# Patient Record
Sex: FEMALE | Race: WHITE | Employment: OTHER | URBAN - NONMETROPOLITAN AREA
[De-identification: names, ages, dates, MRNs, and addresses within clinical notes are randomized per-mention and may not be internally consistent; named-entity substitution may affect disease eponyms.]

---

## 2019-06-13 ENCOUNTER — TELEPHONE (OUTPATIENT)
Dept: FAMILY MEDICINE CLINIC | Age: 84
End: 2019-06-13

## 2019-06-20 ENCOUNTER — HOSPITAL ENCOUNTER (OUTPATIENT)
Age: 84
Discharge: HOME | End: 2019-06-22
Payer: MEDICARE

## 2019-06-20 DIAGNOSIS — I10 ESSENTIAL HYPERTENSION: Primary | ICD-10-CM

## 2019-06-20 DIAGNOSIS — E55.9 VITAMIN D DEFICIENCY: ICD-10-CM

## 2019-06-20 DIAGNOSIS — I10 ESSENTIAL HYPERTENSION: ICD-10-CM

## 2019-06-20 DIAGNOSIS — D51.9 ANEMIA DUE TO VITAMIN B12 DEFICIENCY, UNSPECIFIED B12 DEFICIENCY TYPE: ICD-10-CM

## 2019-06-20 DIAGNOSIS — E11.9 TYPE 2 DIABETES MELLITUS WITHOUT COMPLICATION, WITHOUT LONG-TERM CURRENT USE OF INSULIN (HCC): ICD-10-CM

## 2019-06-20 DIAGNOSIS — E78.5 HYPERLIPIDEMIA, UNSPECIFIED HYPERLIPIDEMIA TYPE: ICD-10-CM

## 2019-06-20 LAB
ALBUMIN SERPL-MCNC: 4.3 G/DL (ref 3.5–5.2)
ALP BLD-CCNC: 80 U/L (ref 35–104)
ALT SERPL-CCNC: 13 U/L (ref 0–32)
ANION GAP SERPL CALCULATED.3IONS-SCNC: 13 MMOL/L (ref 7–16)
AST SERPL-CCNC: 22 U/L (ref 0–31)
BASOPHILS ABSOLUTE: 0.05 E9/L (ref 0–0.2)
BASOPHILS RELATIVE PERCENT: 0.7 % (ref 0–2)
BILIRUB SERPL-MCNC: 1.2 MG/DL (ref 0–1.2)
BUN BLDV-MCNC: 18 MG/DL (ref 8–23)
C-REACTIVE PROTEIN: 0.2 MG/DL (ref 0–0.4)
CALCIUM SERPL-MCNC: 9.2 MG/DL (ref 8.6–10.2)
CHLORIDE BLD-SCNC: 104 MMOL/L (ref 98–107)
CHOLESTEROL, TOTAL: 211 MG/DL (ref 0–199)
CO2: 26 MMOL/L (ref 22–29)
CREAT SERPL-MCNC: 0.9 MG/DL (ref 0.5–1)
CREATININE URINE: 88 MG/DL (ref 29–226)
EOSINOPHILS ABSOLUTE: 0.32 E9/L (ref 0.05–0.5)
EOSINOPHILS RELATIVE PERCENT: 4.7 % (ref 0–6)
FERRITIN: 204 NG/ML
FOLATE: >20 NG/ML (ref 4.8–24.2)
GFR AFRICAN AMERICAN: >60
GFR NON-AFRICAN AMERICAN: 59 ML/MIN/1.73
GLUCOSE BLD-MCNC: 94 MG/DL (ref 74–99)
HBA1C MFR BLD: 6.2 % (ref 4–5.6)
HCT VFR BLD CALC: 35.2 % (ref 34–48)
HDLC SERPL-MCNC: 47 MG/DL
HEMOGLOBIN: 10.9 G/DL (ref 11.5–15.5)
IMMATURE GRANULOCYTES #: 0.01 E9/L
IMMATURE GRANULOCYTES %: 0.1 % (ref 0–5)
IRON SATURATION: 32 % (ref 15–50)
IRON: 79 MCG/DL (ref 37–145)
LDL CHOLESTEROL CALCULATED: 105 MG/DL (ref 0–99)
LYMPHOCYTES ABSOLUTE: 2.48 E9/L (ref 1.5–4)
LYMPHOCYTES RELATIVE PERCENT: 36.4 % (ref 20–42)
MCH RBC QN AUTO: 33.3 PG (ref 26–35)
MCHC RBC AUTO-ENTMCNC: 31 % (ref 32–34.5)
MCV RBC AUTO: 107.6 FL (ref 80–99.9)
MICROALBUMIN UR-MCNC: <12 MG/L
MICROALBUMIN/CREAT UR-RTO: ABNORMAL (ref 0–30)
MONOCYTES ABSOLUTE: 0.59 E9/L (ref 0.1–0.95)
MONOCYTES RELATIVE PERCENT: 8.7 % (ref 2–12)
NEUTROPHILS ABSOLUTE: 3.37 E9/L (ref 1.8–7.3)
NEUTROPHILS RELATIVE PERCENT: 49.4 % (ref 43–80)
PDW BLD-RTO: 12 FL (ref 11.5–15)
PLATELET # BLD: 365 E9/L (ref 130–450)
PMV BLD AUTO: 10.9 FL (ref 7–12)
POTASSIUM SERPL-SCNC: 4.4 MMOL/L (ref 3.5–5)
RBC # BLD: 3.27 E12/L (ref 3.5–5.5)
SODIUM BLD-SCNC: 143 MMOL/L (ref 132–146)
T4 FREE: 1.26 NG/DL (ref 0.93–1.7)
TOTAL CK: 45 U/L (ref 20–180)
TOTAL IRON BINDING CAPACITY: 248 MCG/DL (ref 250–450)
TOTAL PROTEIN: 7.5 G/DL (ref 6.4–8.3)
TRIGL SERPL-MCNC: 293 MG/DL (ref 0–149)
TSH SERPL DL<=0.05 MIU/L-ACNC: 0.99 UIU/ML (ref 0.27–4.2)
URIC ACID, SERUM: 5 MG/DL (ref 2.4–5.7)
VITAMIN B-12: 1053 PG/ML (ref 211–946)
VITAMIN D 25-HYDROXY: 73 NG/ML (ref 30–100)
VLDLC SERPL CALC-MCNC: 59 MG/DL
WBC # BLD: 6.8 E9/L (ref 4.5–11.5)

## 2019-06-20 PROCEDURE — 82728 ASSAY OF FERRITIN: CPT

## 2019-06-20 PROCEDURE — 83550 IRON BINDING TEST: CPT

## 2019-06-20 PROCEDURE — 80061 LIPID PANEL: CPT

## 2019-06-20 PROCEDURE — 84550 ASSAY OF BLOOD/URIC ACID: CPT

## 2019-06-20 PROCEDURE — 84443 ASSAY THYROID STIM HORMONE: CPT

## 2019-06-20 PROCEDURE — 82044 UR ALBUMIN SEMIQUANTITATIVE: CPT

## 2019-06-20 PROCEDURE — 83036 HEMOGLOBIN GLYCOSYLATED A1C: CPT

## 2019-06-20 PROCEDURE — 80053 COMPREHEN METABOLIC PANEL: CPT

## 2019-06-20 PROCEDURE — 36415 COLL VENOUS BLD VENIPUNCTURE: CPT

## 2019-06-20 PROCEDURE — 82306 VITAMIN D 25 HYDROXY: CPT

## 2019-06-20 PROCEDURE — 82550 ASSAY OF CK (CPK): CPT

## 2019-06-20 PROCEDURE — 82607 VITAMIN B-12: CPT

## 2019-06-20 PROCEDURE — 85025 COMPLETE CBC W/AUTO DIFF WBC: CPT

## 2019-06-20 PROCEDURE — 84439 ASSAY OF FREE THYROXINE: CPT

## 2019-06-20 PROCEDURE — 86140 C-REACTIVE PROTEIN: CPT

## 2019-06-20 PROCEDURE — 82570 ASSAY OF URINE CREATININE: CPT

## 2019-06-20 PROCEDURE — 83540 ASSAY OF IRON: CPT

## 2019-06-20 PROCEDURE — 82746 ASSAY OF FOLIC ACID SERUM: CPT

## 2019-06-24 ENCOUNTER — OFFICE VISIT (OUTPATIENT)
Dept: FAMILY MEDICINE CLINIC | Age: 84
End: 2019-06-24
Payer: MEDICARE

## 2019-06-24 VITALS
HEART RATE: 76 BPM | HEIGHT: 55 IN | OXYGEN SATURATION: 93 % | SYSTOLIC BLOOD PRESSURE: 124 MMHG | WEIGHT: 111.4 LBS | TEMPERATURE: 98.7 F | DIASTOLIC BLOOD PRESSURE: 62 MMHG | BODY MASS INDEX: 25.78 KG/M2

## 2019-06-24 DIAGNOSIS — E11.9 TYPE 2 DIABETES MELLITUS WITHOUT COMPLICATION, WITHOUT LONG-TERM CURRENT USE OF INSULIN (HCC): ICD-10-CM

## 2019-06-24 DIAGNOSIS — E55.9 VITAMIN D DEFICIENCY: ICD-10-CM

## 2019-06-24 DIAGNOSIS — I10 ESSENTIAL HYPERTENSION: Primary | ICD-10-CM

## 2019-06-24 DIAGNOSIS — E78.5 HYPERLIPIDEMIA, UNSPECIFIED HYPERLIPIDEMIA TYPE: ICD-10-CM

## 2019-06-24 PROCEDURE — 99214 OFFICE O/P EST MOD 30 MIN: CPT | Performed by: FAMILY MEDICINE

## 2019-06-24 RX ORDER — LEVOTHYROXINE SODIUM 0.03 MG/1
25 TABLET ORAL DAILY
Qty: 90 TABLET | Refills: 1 | Status: SHIPPED | OUTPATIENT
Start: 2019-06-24 | End: 2020-01-03 | Stop reason: SDUPTHER

## 2019-06-24 RX ORDER — GLIPIZIDE 5 MG/1
5 TABLET, FILM COATED, EXTENDED RELEASE ORAL
Qty: 180 TABLET | Refills: 1 | Status: SHIPPED | OUTPATIENT
Start: 2019-06-24 | End: 2020-01-03 | Stop reason: SDUPTHER

## 2019-06-24 RX ORDER — FLUTICASONE PROPIONATE 50 MCG
1 SPRAY, SUSPENSION (ML) NASAL DAILY
COMMUNITY
End: 2020-01-03 | Stop reason: ALTCHOICE

## 2019-06-24 RX ORDER — PAROXETINE HYDROCHLORIDE 20 MG/1
20 TABLET, FILM COATED ORAL
COMMUNITY
Start: 2019-04-23 | End: 2019-06-24 | Stop reason: SDUPTHER

## 2019-06-24 RX ORDER — LOSARTAN POTASSIUM 50 MG/1
50 TABLET ORAL DAILY
COMMUNITY
Start: 2019-04-23 | End: 2019-06-24 | Stop reason: SDUPTHER

## 2019-06-24 RX ORDER — PANTOPRAZOLE SODIUM 40 MG/1
40 TABLET, DELAYED RELEASE ORAL
COMMUNITY
End: 2019-06-24 | Stop reason: SDUPTHER

## 2019-06-24 RX ORDER — GLIPIZIDE 5 MG/1
5 TABLET, FILM COATED, EXTENDED RELEASE ORAL
COMMUNITY
Start: 2019-04-23 | End: 2019-06-24 | Stop reason: SDUPTHER

## 2019-06-24 RX ORDER — OXYBUTYNIN CHLORIDE 5 MG/1
5 TABLET ORAL DAILY
COMMUNITY
Start: 2019-04-23 | End: 2019-06-24 | Stop reason: SDUPTHER

## 2019-06-24 RX ORDER — DONEPEZIL HYDROCHLORIDE 10 MG/1
10 TABLET, FILM COATED ORAL
COMMUNITY
Start: 2019-04-23 | End: 2019-06-24 | Stop reason: SDUPTHER

## 2019-06-24 RX ORDER — LANSOPRAZOLE 15 MG/1
15 CAPSULE, DELAYED RELEASE ORAL DAILY
COMMUNITY
End: 2020-06-22 | Stop reason: SDUPTHER

## 2019-06-24 RX ORDER — PAROXETINE HYDROCHLORIDE 20 MG/1
20 TABLET, FILM COATED ORAL EVERY MORNING
Qty: 90 TABLET | Refills: 1 | Status: SHIPPED | OUTPATIENT
Start: 2019-06-24 | End: 2020-01-03 | Stop reason: SDUPTHER

## 2019-06-24 RX ORDER — DOCUSATE SODIUM 100 MG/1
100 CAPSULE, LIQUID FILLED ORAL DAILY
COMMUNITY

## 2019-06-24 RX ORDER — M-VIT,TX,IRON,MINS/CALC/FOLIC 27MG-0.4MG
1 TABLET ORAL DAILY
COMMUNITY
End: 2020-12-21

## 2019-06-24 RX ORDER — OXYBUTYNIN CHLORIDE 5 MG/1
5 TABLET ORAL DAILY
Qty: 90 TABLET | Refills: 1 | Status: SHIPPED | OUTPATIENT
Start: 2019-06-24 | End: 2020-01-03 | Stop reason: SDUPTHER

## 2019-06-24 RX ORDER — DONEPEZIL HYDROCHLORIDE 10 MG/1
10 TABLET, FILM COATED ORAL NIGHTLY
Qty: 90 TABLET | Refills: 1 | Status: SHIPPED | OUTPATIENT
Start: 2019-06-24 | End: 2020-01-03 | Stop reason: SDUPTHER

## 2019-06-24 RX ORDER — PANTOPRAZOLE SODIUM 40 MG/1
40 TABLET, DELAYED RELEASE ORAL DAILY
Qty: 90 TABLET | Refills: 1 | Status: SHIPPED | OUTPATIENT
Start: 2019-06-24 | End: 2020-01-03 | Stop reason: ALTCHOICE

## 2019-06-24 RX ORDER — BLOOD-GLUCOSE METER
KIT MISCELLANEOUS
COMMUNITY
End: 2021-06-25 | Stop reason: SDUPTHER

## 2019-06-24 RX ORDER — CETIRIZINE HYDROCHLORIDE 10 MG/1
10 TABLET ORAL DAILY
COMMUNITY
End: 2021-04-29

## 2019-06-24 RX ORDER — BLOOD-GLUCOSE METER
EACH MISCELLANEOUS
COMMUNITY

## 2019-06-24 RX ORDER — LOSARTAN POTASSIUM 50 MG/1
50 TABLET ORAL DAILY
Qty: 90 TABLET | Refills: 1 | Status: SHIPPED | OUTPATIENT
Start: 2019-06-24 | End: 2020-01-03 | Stop reason: SDUPTHER

## 2019-06-24 RX ORDER — LEVOTHYROXINE SODIUM 0.03 MG/1
25 TABLET ORAL DAILY
COMMUNITY
Start: 2019-04-23 | End: 2019-06-24 | Stop reason: SDUPTHER

## 2019-06-24 ASSESSMENT — PATIENT HEALTH QUESTIONNAIRE - PHQ9
SUM OF ALL RESPONSES TO PHQ9 QUESTIONS 1 & 2: 0
1. LITTLE INTEREST OR PLEASURE IN DOING THINGS: 0
SUM OF ALL RESPONSES TO PHQ QUESTIONS 1-9: 0
SUM OF ALL RESPONSES TO PHQ QUESTIONS 1-9: 0
2. FEELING DOWN, DEPRESSED OR HOPELESS: 0

## 2019-06-24 NOTE — PROGRESS NOTES
6/24/19    CC:   Chief Complaint   Patient presents with    Diabetes    Hypertension    Hyperlipidemia        S: patient here for PE of chronic medical problems, med recheck/refill, FBW results, BSS results. Continues with allergies, rhinitis, cough, spastic esophagus. Past Medical History:   Diagnosis Date    Anemia     285.9    Cerebrovascular disease, unspecified     437.9    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 pulmonary tuberculosis without diagnosis     Positive TB Converter    DDD (degenerative disc disease), lumbar     724.3/M54.40    Degenerative joint disease of spine     C/T/L    Dementia     294.20    DM (diabetes mellitus) ([facility] [address]     DM2 (diabetes mellitus, type 2) ([facility]     Dysphagia     Gilbert's syndrome     elevated bili    Hiatal hernia     with GERD and presbyesophagus    Hyperlipidemia     Hypertension     Hypothyroid     goiter    Hypothyroidism, iatrogenic     Macular degeneration     [facility] Cord[facility]    Dr. Corrine Robbins    Multinodular goiter (nontoxic)     Osteoporosis     with compression fx T8/12,L2/5,  805.8/M48. 50Xa    Peptic reflux disease     Tremor     R25.1       Family History   Problem Relation Age of Onset    Cancer Mother         throat    Cancer Father         bladder    Breast Cancer Sister     Diabetes Paternal Grandmother     Arthritis Paternal Grandmother     Breast Cancer Other         cousin       Past Surgical History:   Procedure Laterality Date    CATARACT REMOVAL WITH IMPLANT Bilateral 2011    Dr. Lawton Fast  10/21/2015    Dr. James Peabody, normal    OTHER SURGICAL HISTORY      Lumbar epidurals, Dr. Brad Love ENDOSCOPY  02/08/2010    Dr. Parish Ham, Hiatal hernia       Social History     Tobacco Use    Smoking status: Never Smoker    Smokeless tobacco: Never Used   Substance Use Topics    Alcohol use: Not on file    Drug use: Not on file       ROS:  No CP, No palpitations,   No sob, Admits cough,   No abd pain, No heartburn,   No headaches,   No tingling, No numbness, No weakness,   No bowel changes, No hematochezia, No melena,  No bladder changes, No hematuria  No skin rashes, No skin lesions. No vision changes, No hearing changes,   No polyuria, polydipsia, polyphagia. Stable mood. ROS otherwise negative unless as listed in HPI. Chart reviewed and updated where appropriate for PMH, Fam, and Soc Hx. Physical Exam   /62   Pulse 76   Temp 98.7 °F (37.1 °C)   Ht 4' 7\" (1.397 m)   Wt 111 lb 6.4 oz (50.5 kg)   SpO2 93%   BMI 25.89 kg/m²   Wt Readings from Last 3 Encounters:   06/24/19 111 lb 6.4 oz (50.5 kg)       Constitutional:    She is oriented to person, place, and time. She appears well-developed and well-nourished. HENT:    Right Ear: Tympanic membrane, external ear and ear canal normal.    Left Ear: Tympanic membrane, external ear and ear canal normal.    Nose: Nose normal.    Mouth/Throat: Oropharynx is clear and moist.   Eyes:    Conjunctivae are normal.    Pupils are equal, round, and reactive to light. EOMI. Neck:    Normal range of motion. No thyromegaly or nodules noted. No bruit. Cardiovascular:    Normal rate, regular rhythm and normal heart sounds. No murmur. No gallop and no friction rub. Pulmonary/Chest:    Effort normal and breath sounds normal.    No wheezes. No rales or rhonchi. Abdominal:    Soft. Bowel sounds are normal.    No distension. No tenderness. Musculoskeletal:    Normal range of motion. No joint swelling noted. No peripheral edema. Neurological:    She is alert and oriented to person, place, and time. Motor and sensation grossly intact. Normal Gait. Skin:    Skin is warm and dry. No rashes, lesions. Psychiatric:    She has a normal mood and affect. Normal groom and dress.     Current Outpatient Medications on File Prior to Visit Medication Sig Dispense Refill    Blood Glucose Monitoring Suppl (TRUE METRIX AIR GLUCOSE METER) KEVON True Metrix Glucose Meter   USE AS DIRECTED TO TEST SUGAR      B-D ULTRA-FINE 33 LANCETS MISC TRUEplus Lancets 33 gauge      Probiotic Product (PROBIOTIC PO) Take 1 capsule by mouth      cetirizine (ZYRTEC) 10 MG tablet Take 10 mg by mouth daily      fluticasone (FLONASE) 50 MCG/ACT nasal spray 1 spray by Each Nostril route daily      Multiple Vitamins-Minerals (THERAPEUTIC MULTIVITAMIN-MINERALS) tablet Take 1 tablet by mouth daily      docusate sodium (COLACE) 100 MG capsule Take 100 mg by mouth 2 times daily      lansoprazole (PREVACID) 15 MG delayed release capsule Take 15 mg by mouth daily      Nitrofurantoin Monohyd Macro (MACROBID PO) 100 mg       No current facility-administered medications on file prior to visit. There is no problem list on file for this patient. Assessment / Jeff Sosa was seen today for diabetes, hypertension and hyperlipidemia. Diagnoses and all orders for this visit:    Essential hypertension    Hyperlipidemia, unspecified hyperlipidemia type    Type 2 diabetes mellitus without complication, without long-term current use of insulin (HCC)    Vitamin D deficiency    Other orders  -     donepezil (ARICEPT) 10 MG tablet; Take 1 tablet by mouth nightly  -     glipiZIDE (GLUCOTROL XL) 5 MG extended release tablet; Take 1 tablet by mouth 2 times daily (before meals)  -     levothyroxine (SYNTHROID) 25 MCG tablet; Take 1 tablet by mouth Daily  -     losartan (COZAAR) 50 MG tablet; Take 1 tablet by mouth daily  -     metFORMIN (GLUCOPHAGE) 500 MG tablet; Take 1 tablet by mouth daily With supper  -     oxybutynin (DITROPAN) 5 MG tablet; Take 1 tablet by mouth daily  -     PARoxetine (PAXIL) 20 MG tablet; Take 1 tablet by mouth every morning  -     pantoprazole (PROTONIX) 40 MG tablet;  Take 1 tablet by mouth daily  -     blood glucose test strips (ASCENSIA AUTODISC VI;ONE TOUCH ULTRA TEST VI) strip; 1 each by In Vitro route daily As needed. Reviewed Pmhx, meds, diet, exercise, fbw. Recheck 6 months. No follow-ups on file. Patient counseled to follow up sooner or seek more acute care if symptoms worsening. Electronically signed by Gerardo Montez DO on 6/24/2019    This note may have been created using dictation software.  Efforts were made to reduce grammatical or syntax errors, but some may persist.

## 2019-07-01 ENCOUNTER — OFFICE VISIT (OUTPATIENT)
Dept: FAMILY MEDICINE CLINIC | Age: 84
End: 2019-07-01
Payer: MEDICARE

## 2019-07-01 VITALS
OXYGEN SATURATION: 94 % | DIASTOLIC BLOOD PRESSURE: 64 MMHG | HEIGHT: 55 IN | WEIGHT: 113 LBS | HEART RATE: 85 BPM | SYSTOLIC BLOOD PRESSURE: 130 MMHG | BODY MASS INDEX: 26.15 KG/M2

## 2019-07-01 DIAGNOSIS — Z00.00 ROUTINE GENERAL MEDICAL EXAMINATION AT A HEALTH CARE FACILITY: Primary | ICD-10-CM

## 2019-07-01 PROCEDURE — G0439 PPPS, SUBSEQ VISIT: HCPCS | Performed by: PHYSICIAN ASSISTANT

## 2019-07-01 ASSESSMENT — LIFESTYLE VARIABLES
HOW OFTEN DO YOU HAVE SIX OR MORE DRINKS ON ONE OCCASION: 0
AUDIT-C TOTAL SCORE: 1
HOW MANY STANDARD DRINKS CONTAINING ALCOHOL DO YOU HAVE ON A TYPICAL DAY: 0
HOW OFTEN DO YOU HAVE A DRINK CONTAINING ALCOHOL: 1

## 2019-07-01 ASSESSMENT — PATIENT HEALTH QUESTIONNAIRE - PHQ9
SUM OF ALL RESPONSES TO PHQ QUESTIONS 1-9: 1
SUM OF ALL RESPONSES TO PHQ QUESTIONS 1-9: 1

## 2019-07-01 ASSESSMENT — ANXIETY QUESTIONNAIRES: GAD7 TOTAL SCORE: 1

## 2019-07-01 NOTE — PATIENT INSTRUCTIONS
increase physical activity as much as possible  · Followup with Audiology at the [facility] regarding possible hearing aids  · Check with your Pharmacy regarding tetanus and Shingrix vaccinations.   Both are recommended

## 2019-07-01 NOTE — PROGRESS NOTES
(ARICEPT) 10 MG tablet Take 1 tablet by mouth nightly Yes Raúl Bhardwaj DO   glipiZIDE (GLUCOTROL XL) 5 MG extended release tablet Take 1 tablet by mouth 2 times daily (before meals) Yes Raúl Bhardwaj DO   levothyroxine (SYNTHROID) 25 MCG tablet Take 1 tablet by mouth Daily Yes Raúl Bhardwaj DO   losartan (COZAAR) 50 MG tablet Take 1 tablet by mouth daily Yes Raúl Bhardwaj DO   metFORMIN (GLUCOPHAGE) 500 MG tablet Take 1 tablet by mouth daily With supper Yes Raúl Bhardwaj DO   oxybutynin (DITROPAN) 5 MG tablet Take 1 tablet by mouth daily Yes Raúl Bhardwaj DO   PARoxetine (PAXIL) 20 MG tablet Take 1 tablet by mouth every morning Yes Raúl Bhardwaj DO   pantoprazole (PROTONIX) 40 MG tablet Take 1 tablet by mouth daily Yes Raúl Bhardwaj DO   Probiotic Product (PROBIOTIC PO) Take 1 capsule by mouth Yes Historical Provider, MD   cetirizine (ZYRTEC) 10 MG tablet Take 10 mg by mouth daily Yes Historical Provider, MD   fluticasone (FLONASE) 50 MCG/ACT nasal spray 1 spray by Each Nostril route daily Yes Historical Provider, MD   Multiple Vitamins-Minerals (THERAPEUTIC MULTIVITAMIN-MINERALS) tablet Take 1 tablet by mouth daily Yes Historical Provider, MD   docusate sodium (COLACE) 100 MG capsule Take 100 mg by mouth 2 times daily Yes Historical Provider, MD   lansoprazole (PREVACID) 15 MG delayed release capsule Take 15 mg by mouth daily Yes Historical Provider, MD   blood glucose test strips (ASCENSIA AUTODISC VI;ONE TOUCH ULTRA TEST VI) strip 1 each by In Vitro route daily As needed.  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 Jen Ramos of upper limb, left ([facility] [address] 08/22/2017    Dr. Jennifer Shay    Multinodular goiter (nontoxic)     Osteoporosis     with compression fx T8/12,L2/5,  805.8/M48. 50Xa    Peptic reflux disease     Tremor     R25.1     Past Surgical History:   Procedure Laterality Date    CATARACT REMOVAL WITH IMPLANT Bilateral 2011    Dr. Abernathy Royal  10/21/2015    Dr. Omega Stein, normal    OTHER SURGICAL HISTORY      Lumbar epidurals, Dr. Simpson Harps  02/08/2010    Dr. Brittnee Sweet, Hiatal hernia     Family History   Problem Relation Age of Onset    Cancer Mother         throat    Cancer Father         bladder    Breast Cancer Sister     Diabetes Paternal Grandmother     Arthritis Paternal Grandmother     Breast Cancer Other         cousin       CareTeam (Including outside providers/suppliers regularly involved in providing care):   Patient Care Team:  Yany Stacy DO as PCP - General (Family Medicine)  Yany Stacy DO [facility] PCP [facility]  Jung Gordon MD as Consulting Physician (Ophthalmology: Vitreoretinal Specialist)  Roxanne Garcia MD as Surgeon (Ophthalmology)  Jace Belle (Dermatology)  Merissa Casas DO as Surgeon (Plastic Surgery)    Wt Readings from Last 3 Encounters:   07/01/19 113 lb (51.3 kg)   06/24/19 111 lb 6.4 oz (50.5 kg)     Vitals:    07/01/19 1105   BP: 130/64   Pulse: 85   SpO2: 94%   Weight: 113 lb (51.3 kg)   Height: 4' 7\" (1.397 m)     Body mass index is 26.26 kg/m². Based upon direct observation of the patient, evaluation of cognition reveals recent and remote memory intact.     Resp:  Respirations are regular and

## 2019-12-19 ENCOUNTER — TELEPHONE (OUTPATIENT)
Dept: FAMILY MEDICINE CLINIC | Age: 84
End: 2019-12-19

## 2019-12-20 DIAGNOSIS — E03.9 HYPOTHYROIDISM, UNSPECIFIED TYPE: ICD-10-CM

## 2019-12-20 DIAGNOSIS — E78.5 HYPERLIPIDEMIA, UNSPECIFIED HYPERLIPIDEMIA TYPE: ICD-10-CM

## 2019-12-20 DIAGNOSIS — E11.9 TYPE 2 DIABETES MELLITUS WITHOUT COMPLICATION, WITHOUT LONG-TERM CURRENT USE OF INSULIN (HCC): ICD-10-CM

## 2019-12-20 DIAGNOSIS — I10 ESSENTIAL HYPERTENSION: Primary | ICD-10-CM

## 2019-12-20 DIAGNOSIS — D51.9 ANEMIA DUE TO VITAMIN B12 DEFICIENCY, UNSPECIFIED B12 DEFICIENCY TYPE: ICD-10-CM

## 2019-12-20 DIAGNOSIS — E55.9 VITAMIN D DEFICIENCY: ICD-10-CM

## 2019-12-27 ENCOUNTER — HOSPITAL ENCOUNTER (OUTPATIENT)
Age: 84
Discharge: HOME | End: 2019-12-29
Payer: MEDICARE

## 2019-12-27 DIAGNOSIS — I10 ESSENTIAL HYPERTENSION: ICD-10-CM

## 2019-12-27 DIAGNOSIS — E03.9 HYPOTHYROIDISM, UNSPECIFIED TYPE: ICD-10-CM

## 2019-12-27 DIAGNOSIS — E11.9 TYPE 2 DIABETES MELLITUS WITHOUT COMPLICATION, WITHOUT LONG-TERM CURRENT USE OF INSULIN (HCC): ICD-10-CM

## 2019-12-27 DIAGNOSIS — D51.9 ANEMIA DUE TO VITAMIN B12 DEFICIENCY, UNSPECIFIED B12 DEFICIENCY TYPE: ICD-10-CM

## 2019-12-27 DIAGNOSIS — E55.9 VITAMIN D DEFICIENCY: ICD-10-CM

## 2019-12-27 DIAGNOSIS — E78.5 HYPERLIPIDEMIA, UNSPECIFIED HYPERLIPIDEMIA TYPE: ICD-10-CM

## 2019-12-27 LAB
ALBUMIN SERPL-MCNC: 4.2 G/DL (ref 3.5–5.2)
ALP BLD-CCNC: 73 U/L (ref 35–104)
ALT SERPL-CCNC: 14 U/L (ref 0–32)
ANION GAP SERPL CALCULATED.3IONS-SCNC: 15 MMOL/L (ref 7–16)
AST SERPL-CCNC: 23 U/L (ref 0–31)
BASOPHILS ABSOLUTE: 0.08 E9/L (ref 0–0.2)
BASOPHILS RELATIVE PERCENT: 1 % (ref 0–2)
BILIRUB SERPL-MCNC: 1.2 MG/DL (ref 0–1.2)
BUN BLDV-MCNC: 14 MG/DL (ref 8–23)
CALCIUM SERPL-MCNC: 9.5 MG/DL (ref 8.6–10.2)
CHLORIDE BLD-SCNC: 102 MMOL/L (ref 98–107)
CHOLESTEROL, TOTAL: 196 MG/DL (ref 0–199)
CO2: 26 MMOL/L (ref 22–29)
CREAT SERPL-MCNC: 0.9 MG/DL (ref 0.5–1)
CREATININE URINE: 38 MG/DL (ref 29–226)
EOSINOPHILS ABSOLUTE: 0.42 E9/L (ref 0.05–0.5)
EOSINOPHILS RELATIVE PERCENT: 5.2 % (ref 0–6)
FOLATE: >20 NG/ML (ref 4.8–24.2)
GFR AFRICAN AMERICAN: >60
GFR NON-AFRICAN AMERICAN: 59 ML/MIN/1.73
GLUCOSE BLD-MCNC: 94 MG/DL (ref 74–99)
HBA1C MFR BLD: 6.6 % (ref 4–5.6)
HCT VFR BLD CALC: 35.2 % (ref 34–48)
HDLC SERPL-MCNC: 58 MG/DL
HEMOGLOBIN: 10.8 G/DL (ref 11.5–15.5)
IMMATURE GRANULOCYTES #: 0.02 E9/L
IMMATURE GRANULOCYTES %: 0.2 % (ref 0–5)
LDL CHOLESTEROL CALCULATED: 98 MG/DL (ref 0–99)
LYMPHOCYTES ABSOLUTE: 3.14 E9/L (ref 1.5–4)
LYMPHOCYTES RELATIVE PERCENT: 39.2 % (ref 20–42)
MCH RBC QN AUTO: 32.3 PG (ref 26–35)
MCHC RBC AUTO-ENTMCNC: 30.7 % (ref 32–34.5)
MCV RBC AUTO: 105.4 FL (ref 80–99.9)
MICROALBUMIN UR-MCNC: <12 MG/L
MICROALBUMIN/CREAT UR-RTO: ABNORMAL (ref 0–30)
MONOCYTES ABSOLUTE: 0.76 E9/L (ref 0.1–0.95)
MONOCYTES RELATIVE PERCENT: 9.5 % (ref 2–12)
NEUTROPHILS ABSOLUTE: 3.59 E9/L (ref 1.8–7.3)
NEUTROPHILS RELATIVE PERCENT: 44.9 % (ref 43–80)
PDW BLD-RTO: 12.5 FL (ref 11.5–15)
PLATELET # BLD: 359 E9/L (ref 130–450)
PMV BLD AUTO: 10.9 FL (ref 7–12)
POTASSIUM SERPL-SCNC: 4.3 MMOL/L (ref 3.5–5)
RBC # BLD: 3.34 E12/L (ref 3.5–5.5)
SODIUM BLD-SCNC: 143 MMOL/L (ref 132–146)
T4 FREE: 1.23 NG/DL (ref 0.93–1.7)
TOTAL CK: 46 U/L (ref 20–180)
TOTAL PROTEIN: 7.2 G/DL (ref 6.4–8.3)
TRIGL SERPL-MCNC: 202 MG/DL (ref 0–149)
TSH SERPL DL<=0.05 MIU/L-ACNC: 1.82 UIU/ML (ref 0.27–4.2)
URIC ACID, SERUM: 5.1 MG/DL (ref 2.4–5.7)
VITAMIN B-12: 1055 PG/ML (ref 211–946)
VITAMIN D 25-HYDROXY: 63 NG/ML (ref 30–100)
VLDLC SERPL CALC-MCNC: 40 MG/DL
WBC # BLD: 8 E9/L (ref 4.5–11.5)

## 2019-12-27 PROCEDURE — 36415 COLL VENOUS BLD VENIPUNCTURE: CPT

## 2019-12-27 PROCEDURE — 82306 VITAMIN D 25 HYDROXY: CPT

## 2019-12-27 PROCEDURE — 84439 ASSAY OF FREE THYROXINE: CPT

## 2019-12-27 PROCEDURE — 84550 ASSAY OF BLOOD/URIC ACID: CPT

## 2019-12-27 PROCEDURE — 80061 LIPID PANEL: CPT

## 2019-12-27 PROCEDURE — 85025 COMPLETE CBC W/AUTO DIFF WBC: CPT

## 2019-12-27 PROCEDURE — 82550 ASSAY OF CK (CPK): CPT

## 2019-12-27 PROCEDURE — 82044 UR ALBUMIN SEMIQUANTITATIVE: CPT

## 2019-12-27 PROCEDURE — 84443 ASSAY THYROID STIM HORMONE: CPT

## 2019-12-27 PROCEDURE — 82607 VITAMIN B-12: CPT

## 2019-12-27 PROCEDURE — 82746 ASSAY OF FOLIC ACID SERUM: CPT

## 2019-12-27 PROCEDURE — 83036 HEMOGLOBIN GLYCOSYLATED A1C: CPT

## 2019-12-27 PROCEDURE — 80053 COMPREHEN METABOLIC PANEL: CPT

## 2019-12-27 PROCEDURE — 82570 ASSAY OF URINE CREATININE: CPT

## 2020-01-03 ENCOUNTER — OFFICE VISIT (OUTPATIENT)
Dept: FAMILY MEDICINE CLINIC | Age: 85
End: 2020-01-03
Payer: MEDICARE

## 2020-01-03 VITALS
HEIGHT: 56 IN | WEIGHT: 114 LBS | OXYGEN SATURATION: 95 % | TEMPERATURE: 98.1 F | HEART RATE: 76 BPM | DIASTOLIC BLOOD PRESSURE: 62 MMHG | BODY MASS INDEX: 25.64 KG/M2 | SYSTOLIC BLOOD PRESSURE: 124 MMHG

## 2020-01-03 PROCEDURE — 1123F ACP DISCUSS/DSCN MKR DOCD: CPT | Performed by: FAMILY MEDICINE

## 2020-01-03 PROCEDURE — G8417 CALC BMI ABV UP PARAM F/U: HCPCS | Performed by: FAMILY MEDICINE

## 2020-01-03 PROCEDURE — 1036F TOBACCO NON-USER: CPT | Performed by: FAMILY MEDICINE

## 2020-01-03 PROCEDURE — 4040F PNEUMOC VAC/ADMIN/RCVD: CPT | Performed by: FAMILY MEDICINE

## 2020-01-03 PROCEDURE — G8484 FLU IMMUNIZE NO ADMIN: HCPCS | Performed by: FAMILY MEDICINE

## 2020-01-03 PROCEDURE — G8427 DOCREV CUR MEDS BY ELIG CLIN: HCPCS | Performed by: FAMILY MEDICINE

## 2020-01-03 PROCEDURE — 99214 OFFICE O/P EST MOD 30 MIN: CPT | Performed by: FAMILY MEDICINE

## 2020-01-03 PROCEDURE — 1090F PRES/ABSN URINE INCON ASSESS: CPT | Performed by: FAMILY MEDICINE

## 2020-01-03 RX ORDER — GLUCOSAMINE HCL/CHONDROITIN SU 500-400 MG
CAPSULE ORAL
Qty: 100 STRIP | Refills: 5 | Status: SHIPPED | OUTPATIENT
Start: 2020-01-03 | End: 2020-01-03 | Stop reason: CLARIF

## 2020-01-03 RX ORDER — LEVOTHYROXINE SODIUM 0.03 MG/1
25 TABLET ORAL DAILY
Qty: 90 TABLET | Refills: 1 | Status: SHIPPED
Start: 2020-01-03 | End: 2020-06-22 | Stop reason: SDUPTHER

## 2020-01-03 RX ORDER — GLIPIZIDE 5 MG/1
5 TABLET, FILM COATED, EXTENDED RELEASE ORAL
Qty: 180 TABLET | Refills: 1 | Status: SHIPPED
Start: 2020-01-03 | End: 2020-06-22 | Stop reason: SDUPTHER

## 2020-01-03 RX ORDER — BLOOD-GLUCOSE METER
1 KIT MISCELLANEOUS DAILY
Qty: 1 KIT | Refills: 0 | Status: SHIPPED
Start: 2020-01-03 | End: 2020-12-21

## 2020-01-03 RX ORDER — BLOOD-GLUCOSE METER
1 KIT MISCELLANEOUS DAILY
Qty: 1 KIT | Refills: 0 | Status: SHIPPED | OUTPATIENT
Start: 2020-01-03 | End: 2020-01-03 | Stop reason: CLARIF

## 2020-01-03 RX ORDER — LEVOTHYROXINE SODIUM 0.03 MG/1
25 TABLET ORAL DAILY
Qty: 90 TABLET | Refills: 1 | Status: SHIPPED | OUTPATIENT
Start: 2020-01-03 | End: 2020-01-03 | Stop reason: CLARIF

## 2020-01-03 RX ORDER — DONEPEZIL HYDROCHLORIDE 10 MG/1
10 TABLET, FILM COATED ORAL NIGHTLY
Qty: 90 TABLET | Refills: 1 | Status: SHIPPED | OUTPATIENT
Start: 2020-01-03 | End: 2020-01-03 | Stop reason: CLARIF

## 2020-01-03 RX ORDER — OXYBUTYNIN CHLORIDE 5 MG/1
5 TABLET ORAL DAILY
Qty: 90 TABLET | Refills: 1 | Status: SHIPPED
Start: 2020-01-03 | End: 2020-12-21

## 2020-01-03 RX ORDER — DONEPEZIL HYDROCHLORIDE 10 MG/1
10 TABLET, FILM COATED ORAL NIGHTLY
Qty: 90 TABLET | Refills: 1 | Status: SHIPPED
Start: 2020-01-03 | End: 2020-06-22 | Stop reason: SDUPTHER

## 2020-01-03 RX ORDER — LOSARTAN POTASSIUM 50 MG/1
50 TABLET ORAL DAILY
Qty: 90 TABLET | Refills: 1 | Status: SHIPPED | OUTPATIENT
Start: 2020-01-03 | End: 2020-01-03 | Stop reason: CLARIF

## 2020-01-03 RX ORDER — PAROXETINE HYDROCHLORIDE 20 MG/1
20 TABLET, FILM COATED ORAL EVERY MORNING
Qty: 90 TABLET | Refills: 1 | Status: SHIPPED
Start: 2020-01-03 | End: 2020-06-22 | Stop reason: SDUPTHER

## 2020-01-03 RX ORDER — OXYBUTYNIN CHLORIDE 5 MG/1
5 TABLET ORAL DAILY
Qty: 90 TABLET | Refills: 1 | Status: SHIPPED | OUTPATIENT
Start: 2020-01-03 | End: 2020-01-03 | Stop reason: CLARIF

## 2020-01-03 RX ORDER — GLUCOSAMINE HCL/CHONDROITIN SU 500-400 MG
CAPSULE ORAL
Qty: 100 STRIP | Refills: 5 | Status: SHIPPED
Start: 2020-01-03 | End: 2020-06-22

## 2020-01-03 RX ORDER — PAROXETINE HYDROCHLORIDE 20 MG/1
20 TABLET, FILM COATED ORAL EVERY MORNING
Qty: 90 TABLET | Refills: 1 | Status: SHIPPED | OUTPATIENT
Start: 2020-01-03 | End: 2020-01-03 | Stop reason: CLARIF

## 2020-01-03 RX ORDER — LOSARTAN POTASSIUM 50 MG/1
50 TABLET ORAL DAILY
Qty: 90 TABLET | Refills: 1 | Status: SHIPPED
Start: 2020-01-03 | End: 2020-06-22 | Stop reason: SDUPTHER

## 2020-01-03 NOTE — PROGRESS NOTES
1/3/20    CC:   Chief Complaint   Patient presents with    Diabetes        S: patient here for PE of chronic medical problems, med recheck/refill, FBW results, BSS results. Continues with allergies, rhinitis, cough, spastic esophagus. 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 Abdullahi Russ of upper limb, left ([facility] [address] 08/22/2017    Dr. Sandra Pastrana    Multinodular goiter (nontoxic)     Osteoporosis     with compression fx T8/12,L2/5,  805.8/M48. 50Xa    Peptic reflux disease     Tremor     R25.1       Family History   Problem Relation Age of Onset    Cancer Mother         throat    Cancer Father         bladder    Breast Cancer Sister     Diabetes Paternal Grandmother     Arthritis Paternal Grandmother     Breast Cancer Other         cousin       Past Surgical History:   Procedure Laterality Date    CATARACT REMOVAL WITH IMPLANT Bilateral 2011    Dr. Kath Figueroa  10/21/2015    Dr. Nalini Leon, normal    OTHER SURGICAL HISTORY      Lumbar epidurals, Dr. Tiara Rodriguez  02/08/2010    Dr. Fracisco Huerta, Hiatal hernia       Social History     Tobacco Use    Smoking status: Never Smoker    Smokeless tobacco: Never Used   Substance Use Topics    Alcohol use: Yes     Comment: occ    Drug use: Never ROS:  No CP, No palpitations,   No sob, Admits cough,   No abd pain, No heartburn,   No headaches,   No tingling, No numbness, No weakness,   No bowel changes, No hematochezia, No melena,  No bladder changes, No hematuria  No skin rashes, No skin lesions. No vision changes, No hearing changes,   No polyuria, polydipsia, polyphagia. Stable mood. ROS otherwise negative unless as listed in HPI. Chart reviewed and updated where appropriate for PMH, Fam, and Soc Hx. Physical Exam   /62   Pulse 76   Temp 98.1 °F (36.7 °C) (Temporal)   Ht 4' 8\" (1.422 m)   Wt 114 lb (51.7 kg)   SpO2 95%   BMI 25.56 kg/m²   Wt Readings from Last 3 Encounters:   01/03/20 114 lb (51.7 kg)   07/01/19 113 lb (51.3 kg)   06/24/19 111 lb 6.4 oz (50.5 kg)       Constitutional:    She is oriented to person, place, and time. She appears well-developed and well-nourished. HENT:    Right Ear: Tympanic membrane, external ear and ear canal normal.    Left Ear: Tympanic membrane, external ear and ear canal normal.    Nose: Nose normal.    Mouth/Throat: Oropharynx is clear and moist.   Eyes:    Conjunctivae are normal.    Pupils are equal, round, and reactive to light. EOMI. Neck:    Normal range of motion. No thyromegaly or nodules noted. No bruit. Cardiovascular:    Normal rate, regular rhythm and normal heart sounds. No murmur. No gallop and no friction rub. Pulmonary/Chest:    Effort normal and breath sounds normal.    No wheezes. No rales or rhonchi. Abdominal:    Soft. Bowel sounds are normal.    No distension. No tenderness. Musculoskeletal:    Normal range of motion. No joint swelling noted. No peripheral edema. Neurological:    She is alert and oriented to person, place, and time. Motor and sensation grossly intact. Normal Gait. Skin:    Skin is warm and dry. No rashes, lesions. Psychiatric:    She has a normal mood and affect. Normal groom and dress.     Current Outpatient Medications on File Prior to Visit   Medication Sig Dispense Refill    Cholecalciferol (VITAMIN D-3 SUPER STRENGTH) 50 MCG (2000 UT) TABS Take 1 tablet by mouth daily      Blood Glucose Monitoring Suppl (TRUE METRIX AIR GLUCOSE METER) KEVON True Metrix Glucose Meter   USE AS DIRECTED TO TEST SUGAR      B-D ULTRA-FINE 33 LANCETS MISC TRUEplus Lancets 33 gauge      Probiotic Product (PROBIOTIC PO) Take 1 capsule by mouth      cetirizine (ZYRTEC) 10 MG tablet Take 10 mg by mouth daily      Multiple Vitamins-Minerals (THERAPEUTIC MULTIVITAMIN-MINERALS) tablet Take 1 tablet by mouth daily      docusate sodium (COLACE) 100 MG capsule Take 100 mg by mouth 2 times daily      lansoprazole (PREVACID) 15 MG delayed release capsule Take 15 mg by mouth daily       No current facility-administered medications on file prior to visit. There is no problem list on file for this patient. Day / Solange Estevez was seen today for diabetes. Diagnoses and all orders for this visit:    Essential hypertension    Hyperlipidemia, unspecified hyperlipidemia type    Type 2 diabetes mellitus without complication, without long-term current use of insulin ([facility])    Vitamin D deficiency    Anemia due to vitamin B12 deficiency, unspecified B12 deficiency type    Hypothyroidism, unspecified type    Other orders  -     Discontinue: donepezil (ARICEPT) 10 MG tablet; Take 1 tablet by mouth nightly  -     Discontinue: levothyroxine (SYNTHROID) 25 MCG tablet; Take 1 tablet by mouth Daily  -     Discontinue: losartan (COZAAR) 50 MG tablet; Take 1 tablet by mouth daily  -     Discontinue: metFORMIN (GLUCOPHAGE) 500 MG tablet; Take 1 tablet by mouth daily With supper  -     Discontinue: oxybutynin (DITROPAN) 5 MG tablet; Take 1 tablet by mouth daily  -     Discontinue: PARoxetine (PAXIL) 20 MG tablet;  Take 1 tablet by mouth every morning  -     Discontinue: glucose monitoring kit (FREESTYLE) monitoring kit; 1 kit by Does not apply route daily Dispense brand per insurance coverage  -     Discontinue: blood glucose monitor strips; Test 1 times a day & as needed for symptoms of irregular blood glucose. -     Discontinue: SOFT TOUCH LANCETS MISC; 1 box by Does not apply route daily Dispense brand per insurance coverage  -     blood glucose test strips (ASCENSIA AUTODISC VI;ONE TOUCH ULTRA TEST VI) strip; 1 each by In Vitro route daily As needed. -     glipiZIDE (GLUCOTROL XL) 5 MG extended release tablet; Take 1 tablet by mouth 2 times daily (before meals)  -     blood glucose monitor strips; Test 1 times a day & as needed for symptoms of irregular blood glucose. -     donepezil (ARICEPT) 10 MG tablet; Take 1 tablet by mouth nightly  -     glucose monitoring kit (FREESTYLE) monitoring kit; 1 kit by Does not apply route daily Dispense brand per insurance coverage  -     levothyroxine (SYNTHROID) 25 MCG tablet; Take 1 tablet by mouth Daily  -     losartan (COZAAR) 50 MG tablet; Take 1 tablet by mouth daily  -     metFORMIN (GLUCOPHAGE) 500 MG tablet; Take 1 tablet by mouth daily With supper  -     SOFT TOUCH LANCETS MISC; 1 box by Does not apply route daily Dispense brand per insurance coverage  -     PARoxetine (PAXIL) 20 MG tablet; Take 1 tablet by mouth every morning  -     oxybutynin (DITROPAN) 5 MG tablet; Take 1 tablet by mouth daily      Reviewed Pmhx, meds, diet, exercise, fbw. Recheck 6 months. Return in about 6 months (around 7/3/2020). Patient counseled to follow up sooner or seek more acute care if symptoms worsening. Electronically signed by Niya Zaldivar DO on 1/3/2020    This note may have been created using dictation software.  Efforts were made to reduce grammatical or syntax errors, but some may persist.

## 2020-02-07 ENCOUNTER — TELEPHONE (OUTPATIENT)
Dept: FAMILY MEDICINE CLINIC | Age: 85
End: 2020-02-07

## 2020-02-07 RX ORDER — MECLIZINE HCL 12.5 MG/1
12.5 TABLET ORAL 4 TIMES DAILY PRN
Qty: 30 TABLET | Refills: 2 | Status: SHIPPED | OUTPATIENT
Start: 2020-02-07 | End: 2020-02-14

## 2020-06-03 ENCOUNTER — TELEPHONE (OUTPATIENT)
Dept: ADMINISTRATIVE | Age: 85
End: 2020-06-03

## 2020-06-10 ENCOUNTER — HOSPITAL ENCOUNTER (OUTPATIENT)
Age: 85
Discharge: HOME | End: 2020-06-12
Payer: MEDICARE

## 2020-06-10 LAB
ALBUMIN SERPL-MCNC: 4.2 G/DL (ref 3.5–5.2)
ALP BLD-CCNC: 76 U/L (ref 35–104)
ALT SERPL-CCNC: 10 U/L (ref 0–32)
ANION GAP SERPL CALCULATED.3IONS-SCNC: 9 MMOL/L (ref 7–16)
AST SERPL-CCNC: 20 U/L (ref 0–31)
BASOPHILS ABSOLUTE: 0.08 E9/L (ref 0–0.2)
BASOPHILS RELATIVE PERCENT: 1.2 % (ref 0–2)
BILIRUB SERPL-MCNC: 1.2 MG/DL (ref 0–1.2)
BUN BLDV-MCNC: 17 MG/DL (ref 8–23)
CALCIUM SERPL-MCNC: 9.1 MG/DL (ref 8.6–10.2)
CHLORIDE BLD-SCNC: 101 MMOL/L (ref 98–107)
CO2: 29 MMOL/L (ref 22–29)
CREAT SERPL-MCNC: 1 MG/DL (ref 0.5–1)
EOSINOPHILS ABSOLUTE: 0.28 E9/L (ref 0.05–0.5)
EOSINOPHILS RELATIVE PERCENT: 4.1 % (ref 0–6)
GFR AFRICAN AMERICAN: >60
GFR NON-AFRICAN AMERICAN: 52 ML/MIN/1.73
GLUCOSE BLD-MCNC: 109 MG/DL (ref 74–99)
HBA1C MFR BLD: 6.8 % (ref 4–5.6)
HCT VFR BLD CALC: 33 % (ref 34–48)
HEMOGLOBIN: 10.3 G/DL (ref 11.5–15.5)
IMMATURE GRANULOCYTES #: 0.02 E9/L
IMMATURE GRANULOCYTES %: 0.3 % (ref 0–5)
LYMPHOCYTES ABSOLUTE: 2.36 E9/L (ref 1.5–4)
LYMPHOCYTES RELATIVE PERCENT: 34.8 % (ref 20–42)
MCH RBC QN AUTO: 33 PG (ref 26–35)
MCHC RBC AUTO-ENTMCNC: 31.2 % (ref 32–34.5)
MCV RBC AUTO: 105.8 FL (ref 80–99.9)
MONOCYTES ABSOLUTE: 0.54 E9/L (ref 0.1–0.95)
MONOCYTES RELATIVE PERCENT: 8 % (ref 2–12)
NEUTROPHILS ABSOLUTE: 3.5 E9/L (ref 1.8–7.3)
NEUTROPHILS RELATIVE PERCENT: 51.6 % (ref 43–80)
PDW BLD-RTO: 12 FL (ref 11.5–15)
PLATELET # BLD: 360 E9/L (ref 130–450)
PMV BLD AUTO: 11.2 FL (ref 7–12)
POTASSIUM SERPL-SCNC: 4.5 MMOL/L (ref 3.5–5)
RBC # BLD: 3.12 E12/L (ref 3.5–5.5)
SODIUM BLD-SCNC: 139 MMOL/L (ref 132–146)
TOTAL PROTEIN: 7.5 G/DL (ref 6.4–8.3)
WBC # BLD: 6.8 E9/L (ref 4.5–11.5)

## 2020-06-10 PROCEDURE — 85025 COMPLETE CBC W/AUTO DIFF WBC: CPT

## 2020-06-10 PROCEDURE — 83036 HEMOGLOBIN GLYCOSYLATED A1C: CPT

## 2020-06-10 PROCEDURE — 36415 COLL VENOUS BLD VENIPUNCTURE: CPT

## 2020-06-10 PROCEDURE — 80053 COMPREHEN METABOLIC PANEL: CPT

## 2020-06-22 ENCOUNTER — OFFICE VISIT (OUTPATIENT)
Dept: FAMILY MEDICINE CLINIC | Age: 85
End: 2020-06-22
Payer: MEDICARE

## 2020-06-22 VITALS
SYSTOLIC BLOOD PRESSURE: 120 MMHG | WEIGHT: 114 LBS | TEMPERATURE: 98.7 F | OXYGEN SATURATION: 95 % | BODY MASS INDEX: 25.56 KG/M2 | RESPIRATION RATE: 16 BRPM | DIASTOLIC BLOOD PRESSURE: 64 MMHG | HEART RATE: 80 BPM

## 2020-06-22 PROBLEM — E11.9 TYPE 2 DIABETES MELLITUS WITHOUT COMPLICATION, WITHOUT LONG-TERM CURRENT USE OF INSULIN (HCC): Status: ACTIVE | Noted: 2020-06-22

## 2020-06-22 PROBLEM — F41.9 ANXIETY: Status: ACTIVE | Noted: 2020-06-22

## 2020-06-22 PROBLEM — K21.9 GASTROESOPHAGEAL REFLUX DISEASE: Status: ACTIVE | Noted: 2020-06-22

## 2020-06-22 PROBLEM — F02.80 LATE ONSET ALZHEIMER'S DISEASE WITHOUT BEHAVIORAL DISTURBANCE (HCC): Status: ACTIVE | Noted: 2020-06-22

## 2020-06-22 PROBLEM — G30.1 LATE ONSET ALZHEIMER'S DISEASE WITHOUT BEHAVIORAL DISTURBANCE (HCC): Status: ACTIVE | Noted: 2020-06-22

## 2020-06-22 PROBLEM — M80.00XA AGE-RELATED OSTEOPOROSIS WITH CURRENT PATHOLOGICAL FRACTURE: Status: ACTIVE | Noted: 2020-06-22

## 2020-06-22 PROBLEM — E03.9 HYPOTHYROIDISM: Status: ACTIVE | Noted: 2020-06-22

## 2020-06-22 PROBLEM — H35.30 MACULAR DEGENERATION OF BOTH EYES: Status: ACTIVE | Noted: 2020-06-22

## 2020-06-22 PROBLEM — D64.9 CHRONIC ANEMIA: Status: ACTIVE | Noted: 2020-06-22

## 2020-06-22 PROBLEM — I10 ESSENTIAL HYPERTENSION: Status: ACTIVE | Noted: 2020-06-22

## 2020-06-22 PROCEDURE — 1036F TOBACCO NON-USER: CPT | Performed by: FAMILY MEDICINE

## 2020-06-22 PROCEDURE — 99214 OFFICE O/P EST MOD 30 MIN: CPT | Performed by: FAMILY MEDICINE

## 2020-06-22 PROCEDURE — 1090F PRES/ABSN URINE INCON ASSESS: CPT | Performed by: FAMILY MEDICINE

## 2020-06-22 PROCEDURE — 4040F PNEUMOC VAC/ADMIN/RCVD: CPT | Performed by: FAMILY MEDICINE

## 2020-06-22 PROCEDURE — 1123F ACP DISCUSS/DSCN MKR DOCD: CPT | Performed by: FAMILY MEDICINE

## 2020-06-22 PROCEDURE — G8427 DOCREV CUR MEDS BY ELIG CLIN: HCPCS | Performed by: FAMILY MEDICINE

## 2020-06-22 PROCEDURE — G8417 CALC BMI ABV UP PARAM F/U: HCPCS | Performed by: FAMILY MEDICINE

## 2020-06-22 RX ORDER — LEVOTHYROXINE SODIUM 0.03 MG/1
25 TABLET ORAL DAILY
Qty: 90 TABLET | Refills: 1 | Status: SHIPPED
Start: 2020-06-22 | End: 2020-10-16

## 2020-06-22 RX ORDER — LANSOPRAZOLE 15 MG/1
15 CAPSULE, DELAYED RELEASE ORAL DAILY
Qty: 90 CAPSULE | Refills: 1 | Status: SHIPPED
Start: 2020-06-22 | End: 2020-10-16

## 2020-06-22 RX ORDER — LOSARTAN POTASSIUM 50 MG/1
50 TABLET ORAL DAILY
Qty: 90 TABLET | Refills: 1 | Status: SHIPPED
Start: 2020-06-22 | End: 2020-10-16

## 2020-06-22 RX ORDER — GLIPIZIDE 5 MG/1
5 TABLET, FILM COATED, EXTENDED RELEASE ORAL
Qty: 90 TABLET | Refills: 1 | Status: SHIPPED
Start: 2020-06-22 | End: 2020-10-16

## 2020-06-22 RX ORDER — PAROXETINE HYDROCHLORIDE 20 MG/1
20 TABLET, FILM COATED ORAL EVERY MORNING
Qty: 90 TABLET | Refills: 1 | Status: SHIPPED
Start: 2020-06-22 | End: 2020-10-16

## 2020-06-22 RX ORDER — GLIPIZIDE 5 MG/1
5 TABLET, FILM COATED, EXTENDED RELEASE ORAL
Qty: 90 TABLET | Refills: 1
Start: 2020-06-22 | End: 2020-06-22 | Stop reason: SDUPTHER

## 2020-06-22 RX ORDER — DONEPEZIL HYDROCHLORIDE 10 MG/1
10 TABLET, FILM COATED ORAL NIGHTLY
Qty: 90 TABLET | Refills: 1 | Status: SHIPPED
Start: 2020-06-22 | End: 2020-10-16

## 2020-06-22 NOTE — PROGRESS NOTES
Vitamins-Minerals (THERAPEUTIC MULTIVITAMIN-MINERALS) tablet Take 1 tablet by mouth daily       No current facility-administered medications on file prior to visit. Patient Active Problem List   Diagnosis Code    Macular degeneration of both eyes H35.30    Chronic anemia D64.9    Type 2 diabetes mellitus without complication, without long-term current use of insulin ([address] E11.9    Hypothyroidism E03.9    Gastroesophageal reflux disease K21.9      ________________________________________________________  Assessment / Marjorie Linda was seen today for new patient, diabetes and hypothyroidism. Diagnoses and all orders for this visit:    Gastroesophageal reflux disease, esophagitis presence not specified, stable, controlled  - continue    lansoprazole (PREVACID) 15 MG delayed release capsule; Take 1 capsule by mouth daily    Hypothyroidism, unspecified type, stable, controlled  -  continue   levothyroxine (SYNTHROID) 25 MCG tablet; Take 1 tablet by mouth Daily    Type 2 diabetes mellitus without complication, without long-term current use of insulin ([address] well controlled with a recent hypoglycemic reading ([facility])  -   Continue  metFORMIN (GLUCOPHAGE) 500 MG tablet; Take 1 tablet by mouth daily With supper  - Decrease    glipiZIDE (GLUCOTROL XL) 5 MG extended release tablet; Take 1 tablet by mouth daily (with breakfast)    Chronic anemia  Worked up in past. Fairly steady over time. Sounds familial.   Monitor. Macular degeneration of both eyes, unspecified type  Following with ophthal.   Causes significant ADL impact    Age-related osteoporosis with current pathological fracture, sequela    Anxiety  -     PARoxetine (PAXIL) 20 MG tablet; Take 1 tablet by mouth every morning    dementia  -     donepezil (ARICEPT) 10 MG tablet; Take 1 tablet by mouth nightly    HTN, controlled, continue same.   -     losartan (COZAAR) 50 MG tablet;  Take 1 tablet by mouth daily    Return in about 6 months (around

## 2020-09-02 ENCOUNTER — TELEPHONE (OUTPATIENT)
Dept: FAMILY MEDICINE CLINIC | Age: 85
End: 2020-09-02

## 2020-09-28 NOTE — RESULT ENCOUNTER NOTE
Please advise patient of normal mammogram.  Next recommended in 1 year if she chooses to continue screening.

## 2020-10-16 RX ORDER — PAROXETINE HYDROCHLORIDE 20 MG/1
TABLET, FILM COATED ORAL
Qty: 90 TABLET | Refills: 3 | Status: SHIPPED
Start: 2020-10-16 | End: 2020-12-21 | Stop reason: SDUPTHER

## 2020-10-16 RX ORDER — LEVOTHYROXINE SODIUM 0.03 MG/1
25 TABLET ORAL DAILY
Qty: 90 TABLET | Refills: 3 | Status: SHIPPED
Start: 2020-10-16 | End: 2020-12-21 | Stop reason: SDUPTHER

## 2020-10-16 RX ORDER — DONEPEZIL HYDROCHLORIDE 10 MG/1
TABLET, FILM COATED ORAL
Qty: 90 TABLET | Refills: 3 | Status: SHIPPED
Start: 2020-10-16 | End: 2020-12-21 | Stop reason: SDUPTHER

## 2020-10-16 RX ORDER — GLIPIZIDE 5 MG/1
TABLET, FILM COATED, EXTENDED RELEASE ORAL
Qty: 90 TABLET | Refills: 3 | Status: SHIPPED
Start: 2020-10-16 | End: 2020-12-21 | Stop reason: SDUPTHER

## 2020-10-16 RX ORDER — LANSOPRAZOLE 15 MG/1
15 CAPSULE, DELAYED RELEASE ORAL DAILY
Qty: 90 CAPSULE | Refills: 3 | Status: SHIPPED
Start: 2020-10-16 | End: 2020-12-21 | Stop reason: SDUPTHER

## 2020-10-16 RX ORDER — LOSARTAN POTASSIUM 50 MG/1
50 TABLET ORAL DAILY
Qty: 90 TABLET | Refills: 3 | Status: SHIPPED
Start: 2020-10-16 | End: 2020-12-21 | Stop reason: SDUPTHER

## 2020-12-09 ENCOUNTER — TELEPHONE (OUTPATIENT)
Dept: PRIMARY CARE CLINIC | Age: 85
End: 2020-12-09

## 2020-12-09 NOTE — TELEPHONE ENCOUNTER
Patients daughter Catalina Veliz calling in and is requesting labs be placed prior to appointment on 12/21. Please call Moustapha Mcclure once placed.  [phone]

## 2020-12-15 DIAGNOSIS — E11.9 TYPE 2 DIABETES MELLITUS WITHOUT COMPLICATION, WITHOUT LONG-TERM CURRENT USE OF INSULIN (HCC): ICD-10-CM

## 2020-12-15 DIAGNOSIS — I10 ESSENTIAL HYPERTENSION: ICD-10-CM

## 2020-12-15 DIAGNOSIS — E03.9 HYPOTHYROIDISM, UNSPECIFIED TYPE: ICD-10-CM

## 2020-12-15 LAB
ALBUMIN SERPL-MCNC: 4.4 G/DL (ref 3.5–5.2)
ALP BLD-CCNC: 75 U/L (ref 35–104)
ALT SERPL-CCNC: 14 U/L (ref 0–32)
ANION GAP SERPL CALCULATED.3IONS-SCNC: 18 MMOL/L (ref 7–16)
AST SERPL-CCNC: 24 U/L (ref 0–31)
BASOPHILS ABSOLUTE: 0.09 E9/L (ref 0–0.2)
BASOPHILS RELATIVE PERCENT: 1.2 % (ref 0–2)
BILIRUB SERPL-MCNC: 1.1 MG/DL (ref 0–1.2)
BUN BLDV-MCNC: 18 MG/DL (ref 8–23)
CALCIUM SERPL-MCNC: 9.7 MG/DL (ref 8.6–10.2)
CHLORIDE BLD-SCNC: 105 MMOL/L (ref 98–107)
CHOLESTEROL, TOTAL: 218 MG/DL (ref 0–199)
CO2: 23 MMOL/L (ref 22–29)
CREAT SERPL-MCNC: 1 MG/DL (ref 0.5–1)
EOSINOPHILS ABSOLUTE: 0.39 E9/L (ref 0.05–0.5)
EOSINOPHILS RELATIVE PERCENT: 5.3 % (ref 0–6)
GFR AFRICAN AMERICAN: >60
GFR NON-AFRICAN AMERICAN: 52 ML/MIN/1.73
GLUCOSE BLD-MCNC: 121 MG/DL (ref 74–99)
HBA1C MFR BLD: 7 % (ref 4–5.6)
HCT VFR BLD CALC: 34.4 % (ref 34–48)
HDLC SERPL-MCNC: 67 MG/DL
HEMOGLOBIN: 10.9 G/DL (ref 11.5–15.5)
IMMATURE GRANULOCYTES #: 0.01 E9/L
IMMATURE GRANULOCYTES %: 0.1 % (ref 0–5)
LDL CHOLESTEROL CALCULATED: 123 MG/DL (ref 0–99)
LYMPHOCYTES ABSOLUTE: 3.14 E9/L (ref 1.5–4)
LYMPHOCYTES RELATIVE PERCENT: 42.6 % (ref 20–42)
MCH RBC QN AUTO: 33.4 PG (ref 26–35)
MCHC RBC AUTO-ENTMCNC: 31.7 % (ref 32–34.5)
MCV RBC AUTO: 105.5 FL (ref 80–99.9)
MONOCYTES ABSOLUTE: 0.63 E9/L (ref 0.1–0.95)
MONOCYTES RELATIVE PERCENT: 8.5 % (ref 2–12)
NEUTROPHILS ABSOLUTE: 3.11 E9/L (ref 1.8–7.3)
NEUTROPHILS RELATIVE PERCENT: 42.3 % (ref 43–80)
PDW BLD-RTO: 12.1 FL (ref 11.5–15)
PLATELET # BLD: 401 E9/L (ref 130–450)
PMV BLD AUTO: 11 FL (ref 7–12)
POTASSIUM SERPL-SCNC: 4.5 MMOL/L (ref 3.5–5)
RBC # BLD: 3.26 E12/L (ref 3.5–5.5)
SODIUM BLD-SCNC: 146 MMOL/L (ref 132–146)
TOTAL PROTEIN: 7.6 G/DL (ref 6.4–8.3)
TRIGL SERPL-MCNC: 142 MG/DL (ref 0–149)
TSH SERPL DL<=0.05 MIU/L-ACNC: 1.16 UIU/ML (ref 0.27–4.2)
VLDLC SERPL CALC-MCNC: 28 MG/DL
WBC # BLD: 7.4 E9/L (ref 4.5–11.5)

## 2020-12-21 ENCOUNTER — OFFICE VISIT (OUTPATIENT)
Dept: FAMILY MEDICINE CLINIC | Age: 85
End: 2020-12-21
Payer: MEDICARE

## 2020-12-21 VITALS
HEIGHT: 56 IN | WEIGHT: 114 LBS | HEART RATE: 81 BPM | TEMPERATURE: 98.1 F | SYSTOLIC BLOOD PRESSURE: 136 MMHG | OXYGEN SATURATION: 95 % | DIASTOLIC BLOOD PRESSURE: 70 MMHG | BODY MASS INDEX: 25.64 KG/M2

## 2020-12-21 PROCEDURE — 4040F PNEUMOC VAC/ADMIN/RCVD: CPT | Performed by: FAMILY MEDICINE

## 2020-12-21 PROCEDURE — 1123F ACP DISCUSS/DSCN MKR DOCD: CPT | Performed by: FAMILY MEDICINE

## 2020-12-21 PROCEDURE — 3051F HG A1C>EQUAL 7.0%<8.0%: CPT | Performed by: FAMILY MEDICINE

## 2020-12-21 PROCEDURE — G8510 SCR DEP NEG, NO PLAN REQD: HCPCS | Performed by: FAMILY MEDICINE

## 2020-12-21 PROCEDURE — 3288F FALL RISK ASSESSMENT DOCD: CPT | Performed by: FAMILY MEDICINE

## 2020-12-21 PROCEDURE — 99214 OFFICE O/P EST MOD 30 MIN: CPT | Performed by: FAMILY MEDICINE

## 2020-12-21 PROCEDURE — G8417 CALC BMI ABV UP PARAM F/U: HCPCS | Performed by: FAMILY MEDICINE

## 2020-12-21 PROCEDURE — G8427 DOCREV CUR MEDS BY ELIG CLIN: HCPCS | Performed by: FAMILY MEDICINE

## 2020-12-21 PROCEDURE — 1036F TOBACCO NON-USER: CPT | Performed by: FAMILY MEDICINE

## 2020-12-21 PROCEDURE — G8484 FLU IMMUNIZE NO ADMIN: HCPCS | Performed by: FAMILY MEDICINE

## 2020-12-21 PROCEDURE — 1090F PRES/ABSN URINE INCON ASSESS: CPT | Performed by: FAMILY MEDICINE

## 2020-12-21 RX ORDER — GLIPIZIDE 5 MG/1
TABLET, FILM COATED, EXTENDED RELEASE ORAL
Qty: 90 TABLET | Refills: 3 | Status: SHIPPED
Start: 2020-12-21 | End: 2020-12-21 | Stop reason: SDUPTHER

## 2020-12-21 RX ORDER — LANSOPRAZOLE 15 MG/1
15 CAPSULE, DELAYED RELEASE ORAL DAILY
Qty: 90 CAPSULE | Refills: 3 | Status: SHIPPED
Start: 2020-12-21 | End: 2021-06-25 | Stop reason: SDUPTHER

## 2020-12-21 RX ORDER — LEVOTHYROXINE SODIUM 0.03 MG/1
25 TABLET ORAL DAILY
Qty: 90 TABLET | Refills: 3 | Status: SHIPPED
Start: 2020-12-21 | End: 2021-06-25 | Stop reason: SDUPTHER

## 2020-12-21 RX ORDER — LEVOTHYROXINE SODIUM 0.03 MG/1
25 TABLET ORAL DAILY
Qty: 90 TABLET | Refills: 3 | Status: SHIPPED
Start: 2020-12-21 | End: 2020-12-21 | Stop reason: SDUPTHER

## 2020-12-21 RX ORDER — LOSARTAN POTASSIUM 50 MG/1
50 TABLET ORAL DAILY
Qty: 90 TABLET | Refills: 3 | Status: SHIPPED
Start: 2020-12-21 | End: 2020-12-21 | Stop reason: SDUPTHER

## 2020-12-21 RX ORDER — LOSARTAN POTASSIUM 50 MG/1
50 TABLET ORAL DAILY
Qty: 90 TABLET | Refills: 3 | Status: SHIPPED
Start: 2020-12-21 | End: 2021-06-25 | Stop reason: SDUPTHER

## 2020-12-21 RX ORDER — DONEPEZIL HYDROCHLORIDE 10 MG/1
TABLET, FILM COATED ORAL
Qty: 90 TABLET | Refills: 3 | Status: SHIPPED
Start: 2020-12-21 | End: 2020-12-21 | Stop reason: SDUPTHER

## 2020-12-21 RX ORDER — PAROXETINE HYDROCHLORIDE 20 MG/1
TABLET, FILM COATED ORAL
Qty: 90 TABLET | Refills: 3 | Status: SHIPPED
Start: 2020-12-21 | End: 2020-12-21 | Stop reason: DRUGHIGH

## 2020-12-21 RX ORDER — PAROXETINE HYDROCHLORIDE 20 MG/1
TABLET, FILM COATED ORAL
Qty: 90 TABLET | Refills: 3 | Status: SHIPPED
Start: 2020-12-21 | End: 2021-01-28 | Stop reason: ALTCHOICE

## 2020-12-21 RX ORDER — DONEPEZIL HYDROCHLORIDE 10 MG/1
TABLET, FILM COATED ORAL
Qty: 90 TABLET | Refills: 3 | Status: SHIPPED
Start: 2020-12-21 | End: 2021-06-25 | Stop reason: SDUPTHER

## 2020-12-21 RX ORDER — LANSOPRAZOLE 15 MG/1
15 CAPSULE, DELAYED RELEASE ORAL DAILY
Qty: 90 CAPSULE | Refills: 3 | Status: SHIPPED
Start: 2020-12-21 | End: 2020-12-21 | Stop reason: SDUPTHER

## 2020-12-21 RX ORDER — GLIPIZIDE 5 MG/1
TABLET, FILM COATED, EXTENDED RELEASE ORAL
Qty: 90 TABLET | Refills: 3 | Status: SHIPPED
Start: 2020-12-21 | End: 2021-06-25 | Stop reason: SDUPTHER

## 2020-12-21 RX ORDER — FLUTICASONE PROPIONATE 50 MCG
1 SPRAY, SUSPENSION (ML) NASAL DAILY
COMMUNITY

## 2020-12-21 ASSESSMENT — PATIENT HEALTH QUESTIONNAIRE - PHQ9
SUM OF ALL RESPONSES TO PHQ QUESTIONS 1-9: 1
1. LITTLE INTEREST OR PLEASURE IN DOING THINGS: 0
SUM OF ALL RESPONSES TO PHQ9 QUESTIONS 1 & 2: 1
2. FEELING DOWN, DEPRESSED OR HOPELESS: 1
SUM OF ALL RESPONSES TO PHQ QUESTIONS 1-9: 1
SUM OF ALL RESPONSES TO PHQ QUESTIONS 1-9: 1

## 2020-12-21 NOTE — PROGRESS NOTES
[facility]  Office Progress Note - Dr. Marcela Urbina  12/21/20    CC:   Chief Complaint   Patient presents with    Discuss Medications     Paxil and aricept.  Sweats     Night sweats and bad dreams        HPI: Several nights a week she is noting awful dreams. When she wakes she is \"soaking wet\" - hair, chest.   Disturbs her. Sometimes hard for her to come out of it - getting away, moving. Has been going on a few months. She had bene having some hallucinations as well - pink roses on the wall - sees lighths outside the house. Vision is poor. Daughter says bed sheets are not soaked, pillows, etc.   Nightgown is sometimes wet. Memory challenges and anxiety have been worse lately. She is argumentative about this. Schedule changes cause inc anxiety, anticipating upcoming ventures out into the community. She has a tremor when she is nervous that her daughter points out today. She is quite tremulous. Wt Readings from Last 3 Encounters:   12/21/20 114 lb (51.7 kg)   06/22/20 114 lb (51.7 kg)   01/03/20 114 lb (51.7 kg)       Diabetes mellitus  Follow-up  We stopped morning SFU last apt and her FBS has bene 90s-130s most of the time. High of 159. Lab Results   Component Value Date    LABA1C 7.0 (H) 12/15/2020    LABA1C 6.8 (H) 06/10/2020    LABA1C 6.6 (H) 12/27/2019     Lab Results   Component Value Date    LABMICR <12.0 12/27/2019    LDLCALC 123 (H) 12/15/2020    CREATININE 1.0 12/15/2020     Wt Readings from Last 3 Encounters:   12/21/20 114 lb (51.7 kg)   06/22/20 114 lb (51.7 kg)   01/03/20 114 lb (51.7 kg)     Checks a couple times a week. Daughter on board with this. Patient continues diabetic medication regimen. Compliance is good. No side effects of medications noted. Diabetes is adequately controlled. Peripheral neuropathy is not present. Regular foot exams encouraged. Vision changes are not present. Yearly eye exam encouraged.       Hypertension  Follow-up Blood pressure is controlled today. BP Readings from Last 3 Encounters:   12/21/20 136/70   06/22/20 120/64   01/03/20 124/62     Patient continues medications regularly. Compliance is good. Denies CP, sob, abd pain, headaches, vision changes, dizziness, hypotensive symptoms. No side effects from medications noted. Alzheimers  Continues aricept 10mg nightly. Maybe some sundowning recently  No wandering. [place] and vision poor. Contributes. Repeats some common stories today. Hypothyroidism  Follow up. Has been feeling well recently. Continues levothyroxine. Lab Results   Component Value Date    TSH 1.160 12/15/2020     Wt Readings from Last 3 Encounters:   12/21/20 114 lb (51.7 kg)   06/22/20 114 lb (51.7 kg)   01/03/20 114 lb (51.7 kg)     Generally has not had any increased fatigue from baseline, temperature intolerance, bowel changes, skin or hair changes, palpitations. She saw Dr Gonzalez Stein urology and oxybutynin was Dc due to dry mouth. Started on myrbetrq - 8 week trial.     Dry mouth improved. bladder function about the same - usually has incontinence in the morning. Does better during the day. Her back pain continues to bother her  She has tried multiple injections. She has compression fractures. She takes advil and aleve, but using topical ice seems to help best.   Hx of osteoporosis. Lump on the belly.    Lipoma vs cyst. Not a hernia.   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 Chyna Foreman of upper limb, left ([address] 08/22/2017    Dr. Tammy Brown    Multinodular goiter (nontoxic)     Osteoporosis     with compression fx T8/12,L2/5,  805.8/M48. 50Xa    Peptic reflux disease     Tremor     R25.1       Family History   Problem Relation Age of Onset    Cancer Mother         throat    Cancer Father         bladder    Breast Cancer Sister     Diabetes Paternal Grandmother     Arthritis Paternal Grandmother     Breast Cancer Other         cousin       Past Surgical History:   Procedure Laterality Date    CATARACT REMOVAL WITH IMPLANT Bilateral 2011    Dr. Juventino Stone  10/21/2015    Dr. Aguilar, normal    OTHER SURGICAL HISTORY      Lumbar epidurals, Dr. Craig Garcia  02/08/2010    Dr. Francy Loera, Hiatal hernia       Social History     Tobacco Use    Smoking status: Never Smoker    Smokeless tobacco: Never Used   Substance Use Topics    Alcohol use: Yes     Comment: occ    Drug use: Never     _________________________________________________________  ROS: POSITIVE:As in the HPI  Otherwise:  No CP, No palpitations,   No sob, No cough,   No abd pain, No heartburn,   No headaches, No vision changes, No hearing changes,   No tingling, No numbness, No weakness,   No bowel changes, No hematochezia, No melena,  No bladder changes, No hematuria  No skin rashes, No skin lesions. No polyuria, polydipsia, polyphagia. Stable mood. ROS otherwise negative unless as listed in HPI.     Chart reviewed and updated where appropriate for PMH, Fam, and Soc Hx.  __________________________________________________________  Physical Exam /70 (Site: Left Upper Arm, Position: Sitting, Cuff Size: Medium Adult)   Pulse 81   Temp 98.1 °F (36.7 °C) (Temporal)   Ht 4' 8\" (1.422 m)   Wt 114 lb (51.7 kg)   SpO2 95%   BMI 25.56 kg/m²   Wt Readings from Last 3 Encounters:   12/21/20 114 lb (51.7 kg)   06/22/20 114 lb (51.7 kg)   01/03/20 114 lb (51.7 kg)       Constitutional:    She is oriented to person, place, and time. She appears well-developed and well-nourished. Eyes:    Conjunctivae are normal.    Pupils are equal, round, and reactive to light. EOMI. Neck:    Normal range of motion. No thyromegaly or nodules noted. No bruit. Cardiovascular:    Normal rate, regular rhythm and normal heart sounds. No murmur. No gallop and no friction rub. Pulmonary/Chest:    Effort normal and breath sounds normal.    No wheezes. No rales or rhonchi. Abdominal:    Soft. Bowel sounds are normal.    No distension. No tenderness. 1-2cm mobile mass in the abdominal skin/fat, probably lipoma vs cyst. Nonttp. Musculoskeletal:    Normal range of motion. ttp over spine throughout and paraspinals. No joint swelling noted. No peripheral edema. Skin:    Skin is warm and dry. No rashes, No lesions. Psychiatric:    She has a normal mood and affect. Normal groom and dress. No SI or HI.   ________________________________________________________  Current Outpatient Medications on File Prior to Visit   Medication Sig Dispense Refill    mirabegron (MYRBETRIQ) 50 MG TB24 Take 50 mg by mouth daily      fluticasone (FLONASE) 50 MCG/ACT nasal spray 1 spray by Each Nostril route daily      blood glucose test strips (ASCENSIA AUTODISC VI;ONE TOUCH ULTRA TEST VI) strip 1 each by In Vitro route daily As needed.  100 each 2    Cholecalciferol (VITAMIN D-3 SUPER STRENGTH) 50 MCG (2000 UT) TABS Take 1 tablet by mouth daily      Blood Glucose Monitoring Suppl (TRUE METRIX AIR GLUCOSE METER) KEVON True Metrix Glucose Meter USE AS DIRECTED TO TEST SUGAR      B-D ULTRA-FINE 33 LANCETS MISC TRUEplus Lancets 33 gauge      Probiotic Product (PROBIOTIC PO) Take 1 capsule by mouth      cetirizine (ZYRTEC) 10 MG tablet Take 10 mg by mouth daily      docusate sodium (COLACE) 100 MG capsule Take 100 mg by mouth daily        No current facility-administered medications on file prior to visit. Patient Active Problem List   Diagnosis Code    Macular degeneration of both eyes H35.30    Chronic anemia D64.9    Type 2 diabetes mellitus without complication, without long-term current use of insulin ([facility] [address] E11.9    Hypothyroidism E03.9    Gastroesophageal reflux disease K21.9    Essential hypertension I10    Late onset Alzheimer's disease without behavioral disturbance (HCC) G30.1, F02.80    Anxiety F41.9    Age-related osteoporosis with current pathological fracture M80.00XA      ________________________________________________________  Assessment / Jessie Dunn was seen today for discuss medications and sweats. Diagnoses and all orders for this visit:    Late onset Alzheimer's disease without behavioral disturbance (HCC)  -     PARoxetine (PAXIL) 20 MG tablet; Take a half tablet daily for 3 days and then stop. -     sertraline (ZOLOFT) 50 MG tablet; Take 1 tablet by mouth daily Start with a half tablet daily for the first week. Going to switch her from paxil to zoloft and see if helps with anxiety and nightmares. Suspect she is having some sundowning and daughters note she is more anxious in the day. Continue aricept. Could consider adding namenda. Type 2 diabetes mellitus without complication, without long-term current use of insulin (HCC)  -     metFORMIN (GLUCOPHAGE) 500 MG tablet; Take 1 tablet by mouth daily With supper  -     glipiZIDE (GLUCOTROL XL) 5 MG extended release tablet; TAKE 1 TABLET BY MOUTH  DAILY WITH BREAKFAST  Only slight worsening of A1c and no without low Bs. Continue same medication regimen. Hypothyroidism, unspecified type  -     levothyroxine (SYNTHROID) 25 MCG tablet; Take 1 tablet by mouth Daily  TSh normal.     Gastroesophageal reflux disease  -     lansoprazole (PREVACID) 15 MG delayed release capsule; Take 1 capsule by mouth daily  Stable. Essential hypertension  BP at goal.   -     losartan (COZAAR) 50 MG tablet; Take 1 tablet by mouth daily    Hyperlipidemia, unspecified hyperlipidemia type  Lipids good. Lipoma of torso  No intervention needed, reassured. Chronic bilateral back pain, unspecified back location  Stable, chronic. Massage and topical txs help. Other ordrs  -     donepezil (ARICEPT) 10 MG tablet; TAKE 1 TABLET BY MOUTH AT  NIGHT    Return in about 5 weeks (around 1/25/2021). or as scheduled. Patient counseled to follow up sooner or seek more acute care if symptoms worsening or not improving according to plan. Electronically signed by Michaela Elizabeth MD on 12/21/2020    This note may have been created using dictation software.  Efforts were made to reduce grammatical or syntax errors, but some may persist.

## 2020-12-21 NOTE — TELEPHONE ENCOUNTER
Pt in office today and refills were sent to [facility] in [facility]. Pt requests that they be sent to [facility] instead. (Pended)    Called [facility] and asked them to disregard all med requests except newly prescribed Zoloft. [facility] to fill this one time, [facility] in the future.             Last Appointment:  12/21/2020  Future Appointments   Date Time Provider Meet Julia   1/28/2021 10:40 AM Kwan Banegas  [address] [facility] [address]

## 2021-01-28 ENCOUNTER — OFFICE VISIT (OUTPATIENT)
Dept: FAMILY MEDICINE CLINIC | Age: 86
End: 2021-01-28
Payer: MEDICARE

## 2021-01-28 VITALS
HEART RATE: 85 BPM | SYSTOLIC BLOOD PRESSURE: 118 MMHG | OXYGEN SATURATION: 97 % | WEIGHT: 113 LBS | DIASTOLIC BLOOD PRESSURE: 60 MMHG | BODY MASS INDEX: 25.42 KG/M2 | HEIGHT: 56 IN | TEMPERATURE: 98.2 F

## 2021-01-28 DIAGNOSIS — E11.9 TYPE 2 DIABETES MELLITUS WITHOUT COMPLICATION, WITHOUT LONG-TERM CURRENT USE OF INSULIN (HCC): ICD-10-CM

## 2021-01-28 DIAGNOSIS — G30.1 LATE ONSET ALZHEIMER'S DISEASE WITHOUT BEHAVIORAL DISTURBANCE (HCC): ICD-10-CM

## 2021-01-28 DIAGNOSIS — F02.80 LATE ONSET ALZHEIMER'S DISEASE WITHOUT BEHAVIORAL DISTURBANCE (HCC): ICD-10-CM

## 2021-01-28 PROCEDURE — 1036F TOBACCO NON-USER: CPT | Performed by: FAMILY MEDICINE

## 2021-01-28 PROCEDURE — 1090F PRES/ABSN URINE INCON ASSESS: CPT | Performed by: FAMILY MEDICINE

## 2021-01-28 PROCEDURE — G8427 DOCREV CUR MEDS BY ELIG CLIN: HCPCS | Performed by: FAMILY MEDICINE

## 2021-01-28 PROCEDURE — G8417 CALC BMI ABV UP PARAM F/U: HCPCS | Performed by: FAMILY MEDICINE

## 2021-01-28 PROCEDURE — 1123F ACP DISCUSS/DSCN MKR DOCD: CPT | Performed by: FAMILY MEDICINE

## 2021-01-28 PROCEDURE — G8484 FLU IMMUNIZE NO ADMIN: HCPCS | Performed by: FAMILY MEDICINE

## 2021-01-28 PROCEDURE — 4040F PNEUMOC VAC/ADMIN/RCVD: CPT | Performed by: FAMILY MEDICINE

## 2021-01-28 PROCEDURE — 99213 OFFICE O/P EST LOW 20 MIN: CPT | Performed by: FAMILY MEDICINE

## 2021-01-28 RX ORDER — TOLTERODINE 4 MG/1
CAPSULE, EXTENDED RELEASE ORAL
COMMUNITY
Start: 2021-01-13 | End: 2021-10-18

## 2021-01-28 ASSESSMENT — PATIENT HEALTH QUESTIONNAIRE - PHQ9
SUM OF ALL RESPONSES TO PHQ QUESTIONS 1-9: 2
1. LITTLE INTEREST OR PLEASURE IN DOING THINGS: 1
SUM OF ALL RESPONSES TO PHQ QUESTIONS 1-9: 2
2. FEELING DOWN, DEPRESSED OR HOPELESS: 1

## 2021-01-28 NOTE — PATIENT INSTRUCTIONS
Shingrix is the name of the new shingles vaccine. This is indicated for adults older than 50. It is 90% effective at preventing shingles. Even if shingles occurs we think that it is likely a milder case for those people who have had the vaccine. I would encourage you to check with your insurance company to see if this is a covered service. The cost of the vaccine is between $200-300 out of pocket for each of two shots.

## 2021-01-28 NOTE — PROGRESS NOTES
[facility]  Office Progress Note - Dr. Pepe Noe  1/28/21    CC:   Chief Complaint   Patient presents with    Memory Loss     Recently prescribed zoloft. Pt reports less tremor. Night sweats have improved. HPI: switched paxil to zoloft  Its going well  Sleeping better  Seems like tremor is improved. Daughter thinks a lot less shaky and seems to be doing better emotionally. Daughter and sister continue to not note any significant sweating at night. She usually gets up once nightly to urinate. Patient perceives that she gets up less at night. BS has been nicely controlled. She had a few readings in the 140s, but missed her glipizide. This morning 96, monday 129. Switched from [employer] to [employer]. Still wet in the mornings, but not soaking through the pad, as she had bene. Dry throughout the day.   _________________________________________________________    Assessment / [place] Phoenix was seen today for memory loss. Diagnoses and all orders for this visit:    Late onset Alzheimer's disease without behavioral disturbance ([facility] [address]  -   Continue  sertraline (ZOLOFT) 50 MG tablet; Take 1 tablet by mouth daily  Continue aricept  Mood seems less anxious  hasnt had hallucinations as much if at all. Sleeping better through the night. No SE noted. Type 2 diabetes mellitus without complication, without long-term current use of insulin (HCC)  FBS has bene well controlled. Continue same. Update labs later. covid vaccine #2 upcoming. shingrix maybe at next appt. Return in about 3 months (around 4/28/2021). or as scheduled. Patient counseled to follow up sooner or seek more acute care if symptoms worsening or not improving according to plan.      Electronically signed by Ivette Juarez MD on 1/28/2021    _________________________________________________________  Current Outpatient Medications on File Prior to Visit   Medication Sig Dispense Refill  tolterodine (DETROL LA) 4 MG extended release capsule take 1 capsule by mouth once daily      fluticasone (FLONASE) 50 MCG/ACT nasal spray 1 spray by Each Nostril route daily      donepezil (ARICEPT) 10 MG tablet TAKE 1 TABLET BY MOUTH AT  NIGHT 90 tablet 3    glipiZIDE (GLUCOTROL XL) 5 MG extended release tablet TAKE 1 TABLET BY MOUTH  DAILY WITH BREAKFAST 90 tablet 3    lansoprazole (PREVACID) 15 MG delayed release capsule Take 1 capsule by mouth daily 90 capsule 3    levothyroxine (SYNTHROID) 25 MCG tablet Take 1 tablet by mouth Daily 90 tablet 3    losartan (COZAAR) 50 MG tablet Take 1 tablet by mouth daily 90 tablet 3    metFORMIN (GLUCOPHAGE) 500 MG tablet Take 1 tablet by mouth daily With supper 90 tablet 3    blood glucose test strips (ASCENSIA AUTODISC VI;ONE TOUCH ULTRA TEST VI) strip 1 each by In Vitro route daily As needed. 100 each 2    Cholecalciferol (VITAMIN D-3 SUPER STRENGTH) 50 MCG (2000 UT) TABS Take 1 tablet by mouth daily      Blood Glucose Monitoring Suppl (TRUE METRIX AIR GLUCOSE METER) KEVON True Metrix Glucose Meter   USE AS DIRECTED TO TEST SUGAR      B-D ULTRA-FINE 33 LANCETS MISC TRUEplus Lancets 33 gauge      Probiotic Product (PROBIOTIC PO) Take 1 capsule by mouth      cetirizine (ZYRTEC) 10 MG tablet Take 10 mg by mouth daily      docusate sodium (COLACE) 100 MG capsule Take 100 mg by mouth daily        No current facility-administered medications on file prior to visit.         Patient Active Problem List   Diagnosis Code    Macular degeneration of both eyes H35.30    Chronic anemia D64.9    Type 2 diabetes mellitus without complication, without long-term current use of insulin ([facility] [address] E11.9    Hypothyroidism E03.9    Gastroesophageal reflux disease K21.9    Essential hypertension I10    Late onset Alzheimer's disease without behavioral disturbance (HCC) G30.1, F02.80    Anxiety F41.9    Age-related osteoporosis with current pathological fracture M80.00XA _________________________________________________________  Past Medical History:   Diagnosis Date    Anemia     285.9    Cerebrovascular disease, unspecified     437.9    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 Mika Goncalves of upper limb, left ([facility] [address] 08/22/2017    Dr. Kamila Hebert    Multinodular goiter (nontoxic)     Osteoporosis     with compression fx T8/12,L2/5,  805.8/M48. 50Xa    Peptic reflux disease     Tremor     R25.1       Family History   Problem Relation Age of Onset    Cancer Mother         throat    Cancer Father         bladder    Breast Cancer Sister     Diabetes Paternal Grandmother     Arthritis Paternal Grandmother     Breast Cancer Other         cousin       Past Surgical History:   Procedure Laterality Date    CATARACT REMOVAL WITH IMPLANT Bilateral 2011    Dr. Imelda Junior  10/21/2015    Dr. Rhianna Baeza, normal    OTHER SURGICAL HISTORY      Lumbar epidurals, Dr. Jaylene Castillo  02/08/2010    Dr. Wade Enciso, Hiatal hernia       Social History     Tobacco Use    Smoking status: Never Smoker    Smokeless tobacco: Never Used   Substance Use Topics    Alcohol use: Yes     Comment: occ    Drug use: Never       Chart reviewed and updated where appropriate for PMH, Fam, and Soc Hx.  _________________________________________________________  ROS: POSITIVE: As in the HPI. Otherwise Pertinent negatives are negative.    __________________________________________________________  Physical Exam   /60 (Site: Left Upper Arm, Position: Sitting, Cuff Size: Medium Adult)   Pulse 85   Temp 98.2 °F (36.8 °C) (Temporal)   Ht 4' 8\" (1.422 m)   Wt 113 lb (51.3 kg)   SpO2 97%   BMI 25.33 kg/m²   Wt Readings from Last 3 Encounters:   01/28/21 113 lb (51.3 kg)   12/21/20 114 lb (51.7 kg)   06/22/20 114 lb (51.7 kg)       Constitutional:    She is oriented to person, place, and time. She appears well-developed and well-nourished. Cardiovascular:    Normal rate, regular rhythm and normal heart sounds. No murmur. No gallop and no friction rub. Pulmonary/Chest:    Effort normal and breath sounds normal.    No wheezes. No rales or rhonchi. Abdominal:    Soft. Bowel sounds are normal.    No distension. No tenderness. Skin:    Skin is warm and dry. No rashes, No lesions. Psychiatric:    She has a normal mood and affect. Normal groom and dress. No SI or HI.   ________________________________________________________    This note may have been created using dictation software.  Efforts were made to reduce grammatical or syntax errors, but some may persist.

## 2021-04-19 ENCOUNTER — TELEPHONE (OUTPATIENT)
Dept: FAMILY MEDICINE CLINIC | Age: 86
End: 2021-04-19

## 2021-04-19 DIAGNOSIS — R41.82 ALTERED MENTAL STATUS, UNSPECIFIED ALTERED MENTAL STATUS TYPE: Primary | ICD-10-CM

## 2021-04-19 DIAGNOSIS — E11.9 TYPE 2 DIABETES MELLITUS WITHOUT COMPLICATION, WITHOUT LONG-TERM CURRENT USE OF INSULIN (HCC): ICD-10-CM

## 2021-04-19 NOTE — TELEPHONE ENCOUNTER
Daughter Yola Orosco calling do you want labs before appt?&will you include UA? Daughter feels her dementia has worsened&has been more incontinent. UA was normal at dr valencia a month ago.

## 2021-04-21 DIAGNOSIS — R41.82 ALTERED MENTAL STATUS, UNSPECIFIED ALTERED MENTAL STATUS TYPE: ICD-10-CM

## 2021-04-21 DIAGNOSIS — E11.9 TYPE 2 DIABETES MELLITUS WITHOUT COMPLICATION, WITHOUT LONG-TERM CURRENT USE OF INSULIN (HCC): ICD-10-CM

## 2021-04-21 LAB
ALBUMIN SERPL-MCNC: 4.2 G/DL (ref 3.5–5.2)
ALP BLD-CCNC: 85 U/L (ref 35–104)
ALT SERPL-CCNC: 16 U/L (ref 0–32)
AMORPHOUS: ABNORMAL
ANION GAP SERPL CALCULATED.3IONS-SCNC: 11 MMOL/L (ref 7–16)
AST SERPL-CCNC: 27 U/L (ref 0–31)
BACTERIA: ABNORMAL /HPF
BILIRUB SERPL-MCNC: 1.1 MG/DL (ref 0–1.2)
BILIRUBIN URINE: NEGATIVE
BLOOD, URINE: NEGATIVE
BUN BLDV-MCNC: 18 MG/DL (ref 8–23)
CALCIUM SERPL-MCNC: 9.3 MG/DL (ref 8.6–10.2)
CHLORIDE BLD-SCNC: 102 MMOL/L (ref 98–107)
CLARITY: CLEAR
CO2: 25 MMOL/L (ref 22–29)
COLOR: YELLOW
CREAT SERPL-MCNC: 0.9 MG/DL (ref 0.5–1)
EPITHELIAL CELLS, UA: ABNORMAL /HPF
GFR AFRICAN AMERICAN: >60
GFR NON-AFRICAN AMERICAN: 59 ML/MIN/1.73
GLUCOSE BLD-MCNC: 168 MG/DL (ref 74–99)
GLUCOSE URINE: NEGATIVE MG/DL
HBA1C MFR BLD: 7.1 % (ref 4–5.6)
HCT VFR BLD CALC: 34.7 % (ref 34–48)
HEMOGLOBIN: 10.8 G/DL (ref 11.5–15.5)
HYALINE CASTS: ABNORMAL /LPF (ref 0–2)
KETONES, URINE: NEGATIVE MG/DL
LEUKOCYTE ESTERASE, URINE: ABNORMAL
MCH RBC QN AUTO: 32.3 PG (ref 26–35)
MCHC RBC AUTO-ENTMCNC: 31.1 % (ref 32–34.5)
MCV RBC AUTO: 103.9 FL (ref 80–99.9)
NITRITE, URINE: NEGATIVE
PDW BLD-RTO: 11.9 FL (ref 11.5–15)
PH UA: 5.5 (ref 5–9)
PLATELET # BLD: 438 E9/L (ref 130–450)
PMV BLD AUTO: 11.3 FL (ref 7–12)
POTASSIUM SERPL-SCNC: 4.6 MMOL/L (ref 3.5–5)
PROTEIN UA: NEGATIVE MG/DL
RBC # BLD: 3.34 E12/L (ref 3.5–5.5)
RBC UA: ABNORMAL /HPF (ref 0–2)
RENAL EPITHELIAL, UA: ABNORMAL /HPF
SODIUM BLD-SCNC: 138 MMOL/L (ref 132–146)
SPECIFIC GRAVITY UA: 1.02 (ref 1–1.03)
TOTAL PROTEIN: 7.6 G/DL (ref 6.4–8.3)
TSH SERPL DL<=0.05 MIU/L-ACNC: 1.13 UIU/ML (ref 0.27–4.2)
UROBILINOGEN, URINE: 0.2 E.U./DL
WBC # BLD: 10.6 E9/L (ref 4.5–11.5)
WBC UA: >20 /HPF (ref 0–5)

## 2021-04-22 ENCOUNTER — TELEPHONE (OUTPATIENT)
Dept: FAMILY MEDICINE CLINIC | Age: 86
End: 2021-04-22

## 2021-04-22 RX ORDER — SULFAMETHOXAZOLE AND TRIMETHOPRIM 800; 160 MG/1; MG/1
1 TABLET ORAL 2 TIMES DAILY
Qty: 10 TABLET | Refills: 0 | Status: SHIPPED | OUTPATIENT
Start: 2021-04-22 | End: 2021-04-27

## 2021-04-22 NOTE — TELEPHONE ENCOUNTER
Pedrito Viveros - Daughter  [phone]       She has an appointment with you next week and asking if you can look at her bottom during the visit because of her complaints? She is asking ahead of time to be sure there is enough time for that at the appointment.

## 2021-04-29 ENCOUNTER — OFFICE VISIT (OUTPATIENT)
Dept: FAMILY MEDICINE CLINIC | Age: 86
End: 2021-04-29
Payer: MEDICARE

## 2021-04-29 VITALS
BODY MASS INDEX: 24.75 KG/M2 | HEIGHT: 56 IN | OXYGEN SATURATION: 96 % | SYSTOLIC BLOOD PRESSURE: 134 MMHG | HEART RATE: 103 BPM | TEMPERATURE: 98 F | DIASTOLIC BLOOD PRESSURE: 66 MMHG | WEIGHT: 110 LBS

## 2021-04-29 DIAGNOSIS — N30.00 ACUTE CYSTITIS WITHOUT HEMATURIA: Primary | ICD-10-CM

## 2021-04-29 DIAGNOSIS — G30.1 LATE ONSET ALZHEIMER'S DISEASE WITHOUT BEHAVIORAL DISTURBANCE (HCC): ICD-10-CM

## 2021-04-29 DIAGNOSIS — F41.9 ANXIETY: ICD-10-CM

## 2021-04-29 DIAGNOSIS — F02.80 LATE ONSET ALZHEIMER'S DISEASE WITHOUT BEHAVIORAL DISTURBANCE (HCC): ICD-10-CM

## 2021-04-29 PROCEDURE — G8427 DOCREV CUR MEDS BY ELIG CLIN: HCPCS | Performed by: FAMILY MEDICINE

## 2021-04-29 PROCEDURE — 1123F ACP DISCUSS/DSCN MKR DOCD: CPT | Performed by: FAMILY MEDICINE

## 2021-04-29 PROCEDURE — 1036F TOBACCO NON-USER: CPT | Performed by: FAMILY MEDICINE

## 2021-04-29 PROCEDURE — 99214 OFFICE O/P EST MOD 30 MIN: CPT | Performed by: FAMILY MEDICINE

## 2021-04-29 PROCEDURE — G8420 CALC BMI NORM PARAMETERS: HCPCS | Performed by: FAMILY MEDICINE

## 2021-04-29 PROCEDURE — 4040F PNEUMOC VAC/ADMIN/RCVD: CPT | Performed by: FAMILY MEDICINE

## 2021-04-29 PROCEDURE — 1090F PRES/ABSN URINE INCON ASSESS: CPT | Performed by: FAMILY MEDICINE

## 2021-04-29 RX ORDER — SERTRALINE HYDROCHLORIDE 100 MG/1
100 TABLET, FILM COATED ORAL DAILY
Qty: 30 TABLET | Refills: 1 | Status: SHIPPED
Start: 2021-04-29 | End: 2021-04-29 | Stop reason: SDUPTHER

## 2021-04-29 RX ORDER — SERTRALINE HYDROCHLORIDE 100 MG/1
100 TABLET, FILM COATED ORAL DAILY
Qty: 90 TABLET | Refills: 1 | Status: SHIPPED
Start: 2021-04-29 | End: 2021-06-25 | Stop reason: SDUPTHER

## 2021-04-29 NOTE — TELEPHONE ENCOUNTER
Last Appointment:  4/29/2021  Future Appointments   Date Time Provider Meet [place]   6/3/2021  2:40 PM Cammy Nieves MD [facility]      Cancelled the script at [facility], per daughter Elva Pour they have enough zoloft at home to make 100mg and would like the 90 day sent to [facility].

## 2021-04-29 NOTE — PROGRESS NOTES
[facility]  Office Progress Note - Dr. Chaitanya Acosta  4/29/21    CC:   Chief Complaint   Patient presents with    Bloated    Pain     pain with urination        /66 (Site: Left Upper Arm, Position: Sitting, Cuff Size: Medium Adult)   Pulse 103   Temp 98 °F (36.7 °C) (Temporal)   Ht 4' 8\" (1.422 m)   Wt 110 lb (49.9 kg)   SpO2 96%   BMI 24.66 kg/m²   Wt Readings from Last 3 Encounters:   04/29/21 110 lb (49.9 kg)   01/28/21 113 lb (51.3 kg)   12/21/20 114 lb (51.7 kg)       HPI: maybe has had some pain with urination recently. Daughter thought she was more off  Wanted some labs done. Daughter says patient complained of a sore on her vaginal area around 4/16. Denied the typical UTI symptom questions at the time. About 3-4 days prior she was needing more/larger incontinence pads than normal. Pt was worried about BM accident at night. Daughter got her some soothing/moisturizing vagisil, and that seemed to help. So ultimately she was complaining of a sore on her skin. We got some labs. She had large leuks >20, 1-3RBCs, few epithelial cells and many bacteria  We treated with Bactrim. Patient then more recently was having abdominal pain, and felt \"very sick\"    complained of lots of flatus and felt like passed some \"liquid\" with flatus. Reports chronic constipative symptoms, but family knows she is moving her bowels. When has tried just prune juice for this she has explosive diarrhea. She has known hemorrhoids and at times says it is related to that and at other times says its the same as it has been for 50 years. Today she says she just fine and memory is impaired. Daughter thinks anxiety continues to be a problem. Very nervous, tremors, decision making, simple choices are getting more difficult. Daughter gets the brunt of the problems. Had to take OTCs way recently - felt that she was mismanaging some of those medications - family is managing them all. Patient upset about loss of independence. We did switch to zoloft and felt that it was initially helping. The bad dreams and \"nightsweats\" both seemed to improve to her daughter. No verbal report from patient and seemed ot be waking better in the morning. As anxiety worsened lately, just last night she mentioned dreams and sweating last night. She has been having some visual hallucinations - wrong colored wallpaper, etc. Patient has reported poor vision. _________________________________________________________    Assessment / Will Mary was seen today for bloated and pain. Diagnoses and all orders for this visit:    Acute cystitis without hematuria  -     URINALYSIS; Future  Pt reports symptoms improving. Unclear if has another problem concurrently with an ulcer or prolapse or rash. She declines an exam today though daughter would like to have further explored. Welcome to come back another day that she is more agreeable (or est with gyn). Late onset Alzheimer's disease without behavioral disturbance (HCC)  -     INcrease: sertraline (ZOLOFT) 100 MG tablet; Take 1 tablet by mouth daily    Anxiety  -     INcrease: sertraline (ZOLOFT) 100 MG tablet; Take 1 tablet by mouth daily    Increase anxiety recently causing more strife in family. Patient taking OTC pills incorrectly. Daughter has now been managing, patient bitter at the loss of independence. Some increased anxiety symptoms recently. [employer] zoloft as this was helpful to calm those symptoms in the past. cautiously try out higher dose. Return in about 1 month (around 5/29/2021). or as scheduled. Patient counseled to follow up sooner or seek more acute care if symptoms worsening or not improving according to plan.      Electronically signed by Tatianna Craft MD on 4/29/2021    _________________________________________________________  Current Outpatient Medications on File Prior to Visit   Medication Sig Dispense Refill    tolterodine (DETROL LA) 4 MG extended release capsule take 1 capsule by mouth once daily      fluticasone (FLONASE) 50 MCG/ACT nasal spray 1 spray by Each Nostril route daily      donepezil (ARICEPT) 10 MG tablet TAKE 1 TABLET BY MOUTH AT  NIGHT 90 tablet 3    glipiZIDE (GLUCOTROL XL) 5 MG extended release tablet TAKE 1 TABLET BY MOUTH  DAILY WITH BREAKFAST 90 tablet 3    lansoprazole (PREVACID) 15 MG delayed release capsule Take 1 capsule by mouth daily 90 capsule 3    levothyroxine (SYNTHROID) 25 MCG tablet Take 1 tablet by mouth Daily 90 tablet 3    losartan (COZAAR) 50 MG tablet Take 1 tablet by mouth daily 90 tablet 3    metFORMIN (GLUCOPHAGE) 500 MG tablet Take 1 tablet by mouth daily With supper 90 tablet 3    blood glucose test strips (ASCENSIA AUTODISC VI;ONE TOUCH ULTRA TEST VI) strip 1 each by In Vitro route daily As needed. 100 each 2    Cholecalciferol (VITAMIN D-3 SUPER STRENGTH) 50 MCG (2000 UT) TABS Take 1 tablet by mouth daily      Blood Glucose Monitoring Suppl (TRUE METRIX AIR GLUCOSE METER) KEVON True Metrix Glucose Meter   USE AS DIRECTED TO TEST SUGAR      B-D ULTRA-FINE 33 LANCETS MISC TRUEplus Lancets 33 gauge      Probiotic Product (PROBIOTIC PO) Take 1 capsule by mouth      docusate sodium (COLACE) 100 MG capsule Take 100 mg by mouth daily        No current facility-administered medications on file prior to visit.         Patient Active Problem List   Diagnosis Code    Macular degeneration of both eyes H35.30    Chronic anemia D64.9    Type 2 diabetes mellitus without complication, without long-term current use of insulin ([facility] E11.9    Hypothyroidism E03.9    Gastroesophageal reflux disease K21.9    Essential hypertension I10    Late onset Alzheimer's disease without behavioral disturbance (HCC) G30.1, F02.80    Anxiety F41.9    Age-related osteoporosis with current pathological fracture M80.00XA _________________________________________________________  Past Medical History:   Diagnosis Date    Anemia     285.9    Cerebrovascular disease, unspecified     437.9    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 Zhang Calderon of upper limb, left ([facility] 08/22/2017    Dr. Carlito Wheeler    Multinodular goiter (nontoxic)     Osteoporosis     with compression fx T8/12,L2/5,  805.8/M48. 50Xa    Peptic reflux disease     Tremor     R25.1       Family History   Problem Relation Age of Onset    Cancer Mother         throat    Cancer Father         bladder    Breast Cancer Sister     Diabetes Paternal Grandmother     Arthritis Paternal Grandmother     Breast Cancer Other         cousin       Past Surgical History:   Procedure Laterality Date    CATARACT REMOVAL WITH IMPLANT Bilateral 2011    Dr. Jordana Vargas  10/21/2015    Dr. Deyanira Swanson, normal    OTHER SURGICAL HISTORY      Lumbar epidurals, Dr. Jesika Dallas  02/08/2010    Dr. Raquel Zafar, Hiatal hernia       Social History     Tobacco Use    Smoking status: Never Smoker    Smokeless tobacco: Never Used   Substance Use Topics    Alcohol use: Yes     Comment: occ    Drug use: Never       Chart reviewed and updated where appropriate for PMH, Fam, and Soc Hx.  _________________________________________________________  ROS: POSITIVE: As in the HPI.    Otherwise Pertinent negatives are negative.    __________________________________________________________  Physical Exam   Constitutional:    She is oriented to person, place, and time. She appears well-developed and well-nourished. Eyes:    Conjunctivae are normal.    Pupils are equal, round, and reactive to light. EOMI. Neck:    Normal range of motion. No thyromegaly or nodules noted. No bruit. No LAD. Cardiovascular:    Normal rate, regular rhythm and normal heart sounds. No murmur. No gallop and no friction rub. Pulmonary/Chest:    Effort normal and breath sounds normal.    No wheezes. No rales or rhonchi. Abdominal:    Soft. Bowel sounds are normal.    No distension. No tenderness. Skin:    Skin is warm and dry. No rashes, No lesions. Psychiatric:    She has a normal mood and affect. Defiant, argumentative with daughter. Normal groom and dress. No SI or HI. STM impaired - cant remember getting labs last week, but says that she does when prompted.   ________________________________________________________    This note may have been created using dictation software.  Efforts were made to reduce errors, but some may persist.

## 2021-04-30 LAB
AMORPHOUS: ABNORMAL
BACTERIA: ABNORMAL /HPF
BILIRUBIN URINE: NEGATIVE
BLOOD, URINE: NEGATIVE
CLARITY: CLEAR
COLOR: YELLOW
EPITHELIAL CELLS, UA: ABNORMAL /HPF
GLUCOSE URINE: NEGATIVE MG/DL
KETONES, URINE: NEGATIVE MG/DL
LEUKOCYTE ESTERASE, URINE: ABNORMAL
NITRITE, URINE: NEGATIVE
PH UA: 6.5 (ref 5–9)
PROTEIN UA: NEGATIVE MG/DL
RBC UA: ABNORMAL /HPF (ref 0–2)
RENAL EPITHELIAL, UA: ABNORMAL /HPF
SPECIFIC GRAVITY UA: 1.01 (ref 1–1.03)
UROBILINOGEN, URINE: 0.2 E.U./DL
WBC UA: ABNORMAL /HPF (ref 0–5)

## 2021-05-27 ENCOUNTER — TELEPHONE (OUTPATIENT)
Dept: ADMINISTRATIVE | Age: 86
End: 2021-05-27

## 2021-05-27 NOTE — TELEPHONE ENCOUNTER
Pt's daughter would like to r/s, could not take off work on the 2rd. Requesting appt the week of June 7-11 (after 10AM). Pt has another appt on the 6/10 at 9:30 in [place] [address]. Please contact daughter Shelia Rush.     Thank you

## 2021-06-17 ENCOUNTER — TELEPHONE (OUTPATIENT)
Dept: FAMILY MEDICINE CLINIC | Age: 86
End: 2021-06-17

## 2021-06-17 DIAGNOSIS — R10.2 VAGINAL PAIN: ICD-10-CM

## 2021-06-17 DIAGNOSIS — E11.9 TYPE 2 DIABETES MELLITUS WITHOUT COMPLICATION, WITHOUT LONG-TERM CURRENT USE OF INSULIN (HCC): ICD-10-CM

## 2021-06-17 DIAGNOSIS — D64.9 CHRONIC ANEMIA: Primary | ICD-10-CM

## 2021-06-17 NOTE — TELEPHONE ENCOUNTER
Isabella Colin - Daughter  She is asking if you want labs/urine done before her appointment next week?

## 2021-06-22 DIAGNOSIS — D64.9 CHRONIC ANEMIA: ICD-10-CM

## 2021-06-22 DIAGNOSIS — R10.2 VAGINAL PAIN: ICD-10-CM

## 2021-06-22 DIAGNOSIS — E11.9 TYPE 2 DIABETES MELLITUS WITHOUT COMPLICATION, WITHOUT LONG-TERM CURRENT USE OF INSULIN (HCC): ICD-10-CM

## 2021-06-22 LAB
ALBUMIN SERPL-MCNC: 4.4 G/DL (ref 3.5–5.2)
ALP BLD-CCNC: 82 U/L (ref 35–104)
ALT SERPL-CCNC: 15 U/L (ref 0–32)
ANION GAP SERPL CALCULATED.3IONS-SCNC: 14 MMOL/L (ref 7–16)
AST SERPL-CCNC: 26 U/L (ref 0–31)
BASOPHILS ABSOLUTE: 0.08 E9/L (ref 0–0.2)
BASOPHILS RELATIVE PERCENT: 0.9 % (ref 0–2)
BILIRUB SERPL-MCNC: 1.1 MG/DL (ref 0–1.2)
BUN BLDV-MCNC: 16 MG/DL (ref 6–23)
CALCIUM SERPL-MCNC: 9.6 MG/DL (ref 8.6–10.2)
CHLORIDE BLD-SCNC: 102 MMOL/L (ref 98–107)
CO2: 26 MMOL/L (ref 22–29)
CREAT SERPL-MCNC: 1 MG/DL (ref 0.5–1)
CREATININE URINE: 82 MG/DL (ref 29–226)
EOSINOPHILS ABSOLUTE: 0.34 E9/L (ref 0.05–0.5)
EOSINOPHILS RELATIVE PERCENT: 3.9 % (ref 0–6)
GFR AFRICAN AMERICAN: >60
GFR NON-AFRICAN AMERICAN: 52 ML/MIN/1.73
GLUCOSE BLD-MCNC: 139 MG/DL (ref 74–99)
HBA1C MFR BLD: 6.6 % (ref 4–5.6)
HCT VFR BLD CALC: 36 % (ref 34–48)
HEMOGLOBIN: 11.3 G/DL (ref 11.5–15.5)
IMMATURE GRANULOCYTES #: 0.03 E9/L
IMMATURE GRANULOCYTES %: 0.3 % (ref 0–5)
LYMPHOCYTES ABSOLUTE: 2.69 E9/L (ref 1.5–4)
LYMPHOCYTES RELATIVE PERCENT: 30.7 % (ref 20–42)
MCH RBC QN AUTO: 32.5 PG (ref 26–35)
MCHC RBC AUTO-ENTMCNC: 31.4 % (ref 32–34.5)
MCV RBC AUTO: 103.4 FL (ref 80–99.9)
MICROALBUMIN UR-MCNC: <12 MG/L
MICROALBUMIN/CREAT UR-RTO: ABNORMAL (ref 0–30)
MONOCYTES ABSOLUTE: 0.77 E9/L (ref 0.1–0.95)
MONOCYTES RELATIVE PERCENT: 8.8 % (ref 2–12)
NEUTROPHILS ABSOLUTE: 4.85 E9/L (ref 1.8–7.3)
NEUTROPHILS RELATIVE PERCENT: 55.4 % (ref 43–80)
PDW BLD-RTO: 12.5 FL (ref 11.5–15)
PLATELET # BLD: 363 E9/L (ref 130–450)
PMV BLD AUTO: 11.3 FL (ref 7–12)
POTASSIUM SERPL-SCNC: 4.7 MMOL/L (ref 3.5–5)
RBC # BLD: 3.48 E12/L (ref 3.5–5.5)
SODIUM BLD-SCNC: 142 MMOL/L (ref 132–146)
TOTAL PROTEIN: 7.7 G/DL (ref 6.4–8.3)
WBC # BLD: 8.8 E9/L (ref 4.5–11.5)

## 2021-06-25 ENCOUNTER — OFFICE VISIT (OUTPATIENT)
Dept: FAMILY MEDICINE CLINIC | Age: 86
End: 2021-06-25
Payer: MEDICARE

## 2021-06-25 ENCOUNTER — TELEPHONE (OUTPATIENT)
Dept: FAMILY MEDICINE CLINIC | Age: 86
End: 2021-06-25

## 2021-06-25 VITALS
SYSTOLIC BLOOD PRESSURE: 120 MMHG | WEIGHT: 109.8 LBS | DIASTOLIC BLOOD PRESSURE: 64 MMHG | HEART RATE: 97 BPM | OXYGEN SATURATION: 94 % | BODY MASS INDEX: 24.62 KG/M2 | TEMPERATURE: 97.8 F

## 2021-06-25 DIAGNOSIS — E03.9 HYPOTHYROIDISM, UNSPECIFIED TYPE: ICD-10-CM

## 2021-06-25 DIAGNOSIS — K21.9 GASTROESOPHAGEAL REFLUX DISEASE, UNSPECIFIED WHETHER ESOPHAGITIS PRESENT: ICD-10-CM

## 2021-06-25 DIAGNOSIS — R19.7 DIARRHEA, UNSPECIFIED TYPE: Primary | ICD-10-CM

## 2021-06-25 DIAGNOSIS — F02.80 LATE ONSET ALZHEIMER'S DISEASE WITHOUT BEHAVIORAL DISTURBANCE (HCC): ICD-10-CM

## 2021-06-25 DIAGNOSIS — G30.1 LATE ONSET ALZHEIMER'S DISEASE WITHOUT BEHAVIORAL DISTURBANCE (HCC): ICD-10-CM

## 2021-06-25 DIAGNOSIS — E11.9 TYPE 2 DIABETES MELLITUS WITHOUT COMPLICATION, WITHOUT LONG-TERM CURRENT USE OF INSULIN (HCC): ICD-10-CM

## 2021-06-25 DIAGNOSIS — I10 ESSENTIAL HYPERTENSION: ICD-10-CM

## 2021-06-25 PROCEDURE — 1090F PRES/ABSN URINE INCON ASSESS: CPT | Performed by: FAMILY MEDICINE

## 2021-06-25 PROCEDURE — 1123F ACP DISCUSS/DSCN MKR DOCD: CPT | Performed by: FAMILY MEDICINE

## 2021-06-25 PROCEDURE — G8420 CALC BMI NORM PARAMETERS: HCPCS | Performed by: FAMILY MEDICINE

## 2021-06-25 PROCEDURE — 99214 OFFICE O/P EST MOD 30 MIN: CPT | Performed by: FAMILY MEDICINE

## 2021-06-25 PROCEDURE — 1036F TOBACCO NON-USER: CPT | Performed by: FAMILY MEDICINE

## 2021-06-25 PROCEDURE — G8427 DOCREV CUR MEDS BY ELIG CLIN: HCPCS | Performed by: FAMILY MEDICINE

## 2021-06-25 PROCEDURE — 4040F PNEUMOC VAC/ADMIN/RCVD: CPT | Performed by: FAMILY MEDICINE

## 2021-06-25 RX ORDER — LOSARTAN POTASSIUM 50 MG/1
50 TABLET ORAL DAILY
Qty: 90 TABLET | Refills: 3 | Status: SHIPPED | OUTPATIENT
Start: 2021-06-25

## 2021-06-25 RX ORDER — LANSOPRAZOLE 15 MG/1
15 CAPSULE, DELAYED RELEASE ORAL DAILY
Qty: 90 CAPSULE | Refills: 3 | Status: SHIPPED | OUTPATIENT
Start: 2021-06-25

## 2021-06-25 RX ORDER — BLOOD-GLUCOSE METER
KIT MISCELLANEOUS
Qty: 100 EACH | Refills: 1 | Status: SHIPPED | OUTPATIENT
Start: 2021-06-25

## 2021-06-25 RX ORDER — SERTRALINE HYDROCHLORIDE 100 MG/1
100 TABLET, FILM COATED ORAL DAILY
Qty: 90 TABLET | Refills: 1 | Status: SHIPPED
Start: 2021-06-25 | End: 2021-12-14

## 2021-06-25 RX ORDER — GLIPIZIDE 5 MG/1
TABLET, FILM COATED, EXTENDED RELEASE ORAL
Qty: 90 TABLET | Refills: 3 | Status: SHIPPED | OUTPATIENT
Start: 2021-06-25

## 2021-06-25 RX ORDER — DONEPEZIL HYDROCHLORIDE 10 MG/1
TABLET, FILM COATED ORAL
Qty: 90 TABLET | Refills: 3 | Status: SHIPPED | OUTPATIENT
Start: 2021-06-25

## 2021-06-25 RX ORDER — GLUCOSAMINE HCL/CHONDROITIN SU 500-400 MG
CAPSULE ORAL
Qty: 50 STRIP | Refills: 3 | Status: SHIPPED | OUTPATIENT
Start: 2021-06-25

## 2021-06-25 RX ORDER — LEVOTHYROXINE SODIUM 0.03 MG/1
25 TABLET ORAL DAILY
Qty: 90 TABLET | Refills: 3 | Status: SHIPPED | OUTPATIENT
Start: 2021-06-25

## 2021-06-25 SDOH — ECONOMIC STABILITY: FOOD INSECURITY: WITHIN THE PAST 12 MONTHS, THE FOOD YOU BOUGHT JUST DIDN'T LAST AND YOU DIDN'T HAVE MONEY TO GET MORE.: NEVER TRUE

## 2021-06-25 SDOH — ECONOMIC STABILITY: FOOD INSECURITY: WITHIN THE PAST 12 MONTHS, YOU WORRIED THAT YOUR FOOD WOULD RUN OUT BEFORE YOU GOT MONEY TO BUY MORE.: NEVER TRUE

## 2021-06-25 ASSESSMENT — SOCIAL DETERMINANTS OF HEALTH (SDOH): HOW HARD IS IT FOR YOU TO PAY FOR THE VERY BASICS LIKE FOOD, HOUSING, MEDICAL CARE, AND HEATING?: NOT HARD AT ALL

## 2021-06-25 NOTE — PROGRESS NOTES
[facility]  Office Progress Note - Dr. Tatianna Triplett  6/25/21    CC:   Chief Complaint   Patient presents with    Dementia    Diabetes        /64   Pulse 97   Temp 97.8 °F (36.6 °C)   Wt 109 lb 12.8 oz (49.8 kg)   SpO2 94%   BMI 24.62 kg/m²   Wt Readings from Last 3 Encounters:   06/25/21 109 lb 12.8 oz (49.8 kg)   04/29/21 110 lb (49.9 kg)   01/28/21 113 lb (51.3 kg)       HPI: couple bowel accidents recently - at daughters houses. pretty rare but more common recently. Has been taking colace every day, alternating morning and night, and she doesn't really complain of contiaption nearly as often. History is unreliable due to dementia. Dementia  Memory remains problematic in the short-term. She is argumentative with her daughter. Does not remember things accurately all the time. Some occasional confusion overnight. Anxious at times her daughter thinks. We increased her dose of Zoloft last month to 100 mg daily and that seems to be going okay. No new side effects noted. Continues Aricept 10 mg daily. Hypothyroidism  Follow up. Has been feeling well recently. Continues levothyroxine. Lab Results   Component Value Date    TSH 1.130 04/21/2021     Wt Readings from Last 3 Encounters:   06/25/21 109 lb 12.8 oz (49.8 kg)   04/29/21 110 lb (49.9 kg)   01/28/21 113 lb (51.3 kg)   Weight has fluctuated only 5 pounds over the past 2 years. Generally has not had any increased fatigue from baseline, temperature intolerance, bowel changes, skin or hair changes, palpitations.      Diabetes mellitus  Follow-up  Lab Results   Component Value Date    LABA1C 6.6 (H) 06/22/2021    LABA1C 7.1 (H) 04/21/2021    LABA1C 7.0 (H) 12/15/2020     Lab Results   Component Value Date    LABMICR <12.0 06/22/2021    LDLCALC 123 (H) 12/15/2020    CREATININE 1.0 06/22/2021     Wt Readings from Last 3 Encounters:   06/25/21 109 lb 12.8 oz (49.8 kg)   04/29/21 110 lb (49.9 kg)   01/28/21 113 lb (51.3 kg)     Patient continues diabetic medication regimen. Compliance is good. No side effects of medications noted. Diabetes is adequately controlled. Peripheral neuropathy is present. Regular foot exams encouraged. Vision changes are present. Yearly eye exam encouraged. _________________________________________________________    Assessment / Tribune Darvin was seen today for dementia and diabetes. Diagnoses and all orders for this visit:    Diarrhea, unspecified type  Probably multifactorial.  Does not sound infectious. Infrequent and does not seem to correlate any foods. Discussed if this mostly happens in more anxiety provoking situations, they could take an Imodium prior to such situations. Late onset Alzheimer's disease without behavioral disturbance (HCC)  -     sertraline (ZOLOFT) 100 MG tablet; Take 1 tablet by mouth daily  -     donepezil (ARICEPT) 10 MG tablet; TAKE 1 TABLET BY MOUTH AT  NIGHT  Continue    Type 2 diabetes mellitus without complication, without long-term current use of insulin (HCC)  -     metFORMIN (GLUCOPHAGE) 500 MG tablet; Take 1 tablet by mouth daily With supper  -     glipiZIDE (GLUCOTROL XL) 5 MG extended release tablet; TAKE 1 TABLET BY MOUTH  DAILY WITH BREAKFAST  -     B-D ULTRA-FINE 33 LANCETS MISC; TRUEplus Lancets 33 gauge  -     blood glucose monitor strips; Test 1 time daily and as needed for symptoms of irregular glucose. Great control. Advised she does not need to check her blood sugar, but she is resistant to that. Hypothyroidism, unspecified type  -     levothyroxine (SYNTHROID) 25 MCG tablet; Take 1 tablet by mouth Daily  Stable. Gastroesophageal reflux disease, unspecified whether esophagitis present  -     lansoprazole (PREVACID) 15 MG delayed release capsule; Take 1 capsule by mouth daily  Stable. Essential hypertension  -     losartan (COZAAR) 50 MG tablet;  Take 1 tablet by mouth daily  Stable, controlled      Check with lab on urine results. Return in about 3 months (around 9/25/2021). or as scheduled. Patient counseled to follow up sooner or seek more acute care if symptoms worsening or not improving according to plan. Electronically signed by Dano Page MD on 6/25/2021    _________________________________________________________  Current Outpatient Medications on File Prior to Visit   Medication Sig Dispense Refill    tolterodine (DETROL LA) 4 MG extended release capsule take 1 capsule by mouth once daily      fluticasone (FLONASE) 50 MCG/ACT nasal spray 1 spray by Each Nostril route daily      blood glucose test strips (ASCENSIA AUTODISC VI;ONE TOUCH ULTRA TEST VI) strip 1 each by In Vitro route daily As needed. 100 each 2    Cholecalciferol (VITAMIN D-3 SUPER STRENGTH) 50 MCG (2000 UT) TABS Take 1 tablet by mouth daily      Blood Glucose Monitoring Suppl (TRUE METRIX AIR GLUCOSE METER) KEVON True Metrix Glucose Meter   USE AS DIRECTED TO TEST SUGAR      Probiotic Product (PROBIOTIC PO) Take 1 capsule by mouth      docusate sodium (COLACE) 100 MG capsule Take 100 mg by mouth daily        No current facility-administered medications on file prior to visit.        Patient Active Problem List   Diagnosis Code    Macular degeneration of both eyes H35.30    Chronic anemia D64.9    Type 2 diabetes mellitus without complication, without long-term current use of insulin (HCC) E11.9    Hypothyroidism E03.9    Gastroesophageal reflux disease K21.9    Essential hypertension I10    Late onset Alzheimer's disease without behavioral disturbance (HCC) G30.1, F02.80    Anxiety F41.9    Age-related osteoporosis with current pathological fracture M80.00XA     _________________________________________________________  Past Medical History:   Diagnosis Date    Anemia     285.9    Cerebrovascular disease, unspecified     437.9    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 Chery Wadsworth of upper limb, left ([facility] 08/22/2017    Dr. Rani Neumann    Multinodular goiter (nontoxic)     Osteoporosis     with compression fx T8/12,L2/5,  805.8/M48. 50Xa    Peptic reflux disease     Tremor     R25.1       Family History   Problem Relation Age of Onset    Cancer Mother         throat    Cancer Father         bladder    Breast Cancer Sister     Diabetes Paternal Grandmother     Arthritis Paternal Grandmother     Breast Cancer Other         cousin       Past Surgical History:   Procedure Laterality Date    CATARACT REMOVAL WITH IMPLANT Bilateral 2011    Dr. Lina Sandhu  10/21/2015    Dr. Aguilar, normal    OTHER SURGICAL HISTORY      Lumbar epidurals, Dr. Rohini Jordan  02/08/2010    Dr. Lloyd Mosher, Hiatal hernia       Social History     Tobacco Use    Smoking status: Never Smoker    Smokeless tobacco: Never Used   Substance Use Topics    Alcohol use: Yes     Comment: occ    Drug use: Never       Chart reviewed and updated where appropriate for PMH, Fam, and Soc Hx.  _________________________________________________________  ROS: POSITIVE: As in the HPI. Otherwise Pertinent negatives are negative.    __________________________________________________________  Physical Exam   Constitutional:    She is oriented to person, place, and time. She appears well-developed and well-nourished.    Cardiovascular:    Normal rate, regular rhythm and normal heart sounds. No murmur. No gallop and no friction rub. Pulmonary/Chest:    Effort normal and breath sounds normal.    No wheezes. No rales or rhonchi. Abdominal:    Soft. Bowel sounds are normal.    No distension. No tenderness. Musculoskeletal:    Normal range of motion. No joint swelling noted. No peripheral edema. Skin:    Skin is warm and dry. No rashes, No lesions. Psychiatric:    She has a normal mood and affect. Obstinate. Normal groom and dress. No SI or HI.   ________________________________________________________    This note may have been created using dictation software.  Efforts were made to reduce errors, but some may persist.

## 2021-06-25 NOTE — TELEPHONE ENCOUNTER
Called and spoke with lab @ [facility]. They can not see the urinalysis order and said the urine is \"compermised\" it it would be safest to get a new urine sample to run UA.    Please advise if you would like pt to drop off new sample to be sent out for testing

## 2021-06-25 NOTE — TELEPHONE ENCOUNTER
Please follow-up with patient's daughter to close the loop. She was not complaining of any urinary symptoms today, but she is welcome to provide another urine sample if they would like this tested. We were able to get the microalbumin from that sample so the lab did indeed receive it.

## 2021-06-28 NOTE — TELEPHONE ENCOUNTER
Spoke with daughter Baylee Jin and updated on urine. She declined to bring in another urine at this point. She said if Gail Rodriguez complains of urinary s/s again she will call in.

## 2021-07-06 ENCOUNTER — TELEPHONE (OUTPATIENT)
Dept: FAMILY MEDICINE CLINIC | Age: 86
End: 2021-07-06

## 2021-07-06 NOTE — TELEPHONE ENCOUNTER
Daughter calling in about patient and issues that are continuing after seeing you in office. She asked her daughter what should be done if things are coming out more. They are worried that something is going on and would like you to see patient. Patient has agreed to let you evaluate her completely this visit.  You do have one open same day on Friday if you want them messaged there

## 2021-07-06 NOTE — TELEPHONE ENCOUNTER
Lets go next Monday. She needs a gyn exam.    This has been going on a while and I want to keep that spot Friday for more urgent problems or hospital follow up for patients who discharged over the holiday weekend.

## 2021-07-12 ENCOUNTER — OFFICE VISIT (OUTPATIENT)
Dept: FAMILY MEDICINE CLINIC | Age: 86
End: 2021-07-12
Payer: MEDICARE

## 2021-07-12 VITALS
WEIGHT: 109 LBS | HEART RATE: 100 BPM | SYSTOLIC BLOOD PRESSURE: 128 MMHG | TEMPERATURE: 98.6 F | OXYGEN SATURATION: 96 % | BODY MASS INDEX: 24.52 KG/M2 | HEIGHT: 56 IN | DIASTOLIC BLOOD PRESSURE: 68 MMHG

## 2021-07-12 DIAGNOSIS — L90.0 LICHEN SCLEROSUS: ICD-10-CM

## 2021-07-12 DIAGNOSIS — Z00.00 ROUTINE GENERAL MEDICAL EXAMINATION AT A HEALTH CARE FACILITY: Primary | ICD-10-CM

## 2021-07-12 DIAGNOSIS — R30.0 DYSURIA: ICD-10-CM

## 2021-07-12 PROCEDURE — G0439 PPPS, SUBSEQ VISIT: HCPCS | Performed by: FAMILY MEDICINE

## 2021-07-12 PROCEDURE — 4040F PNEUMOC VAC/ADMIN/RCVD: CPT | Performed by: FAMILY MEDICINE

## 2021-07-12 PROCEDURE — 1123F ACP DISCUSS/DSCN MKR DOCD: CPT | Performed by: FAMILY MEDICINE

## 2021-07-12 RX ORDER — LORATADINE 10 MG/1
10 TABLET ORAL DAILY
COMMUNITY

## 2021-07-12 RX ORDER — CLOBETASOL PROPIONATE 0.5 MG/G
CREAM TOPICAL
Qty: 45 G | Refills: 0 | Status: SHIPPED
Start: 2021-07-12 | End: 2022-01-10

## 2021-07-12 ASSESSMENT — PATIENT HEALTH QUESTIONNAIRE - PHQ9
2. FEELING DOWN, DEPRESSED OR HOPELESS: 0
SUM OF ALL RESPONSES TO PHQ QUESTIONS 1-9: 0
SUM OF ALL RESPONSES TO PHQ9 QUESTIONS 1 & 2: 0
1. LITTLE INTEREST OR PLEASURE IN DOING THINGS: 0

## 2021-07-12 NOTE — PATIENT INSTRUCTIONS
Personalized Preventive Plan for Abbey King - 7/12/2021  Medicare offers a range of preventive health benefits. Some of the tests and screenings are paid in full while other may be subject to a deductible, co-insurance, and/or copay. Some of these benefits include a comprehensive review of your medical history including lifestyle, illnesses that may run in your family, and various assessments and screenings as appropriate. After reviewing your medical record and screening and assessments performed today your provider may have ordered immunizations, labs, imaging, and/or referrals for you. A list of these orders (if applicable) as well as your Preventive Care list are included within your After Visit Summary for your review. Other Preventive Recommendations:    · A preventive eye exam performed by an eye specialist is recommended every 1-2 years to screen for glaucoma; cataracts, macular degeneration, and other eye disorders. · A preventive dental visit is recommended every 6 months. · Try to get at least 150 minutes of exercise per week or 10,000 steps per day on a pedometer . · Order or download the FREE \"Exercise & Physical Activity: Your Everyday Guide\" from [employer]. Call 9-176.367.6204 or search [employer] online. · You need 5071-9715 mg of calcium and 4306-5004 IU of vitamin D per day. It is possible to meet your calcium requirement with diet alone, but a vitamin D supplement is usually necessary to meet this goal.  · When exposed to the sun, use a sunscreen that protects against both UVA and UVB radiation with an SPF of 30 or greater. Reapply every 2 to 3 hours or after sweating, drying off with a towel, or swimming. · Always wear a seat belt when traveling in a car. Always wear a helmet when riding a bicycle or motorcycle.

## 2021-07-12 NOTE — PROGRESS NOTES
Medicare Annual Wellness Visit  Name: Renee Meadows Date: 2021   MRN: [id number] Sex: Female   Age: 80 y.o. Ethnicity: Non-/Non    : 1930 Race: Tyler Chapin is here for Medicare AWV    Screenings for behavioral, psychosocial and functional/safety risks, and cognitive dysfunction are all negative except as indicated below. These results, as well as other patient data from the [address] form, are documented in Flowsheets linked to this Encounter. No Known Allergies      Prior to Visit Medications    Medication Sig Taking? Authorizing Provider   loratadine (CLARITIN) 10 MG tablet Take 10 mg by mouth daily Yes Historical Provider, MD   clobetasol (TEMOVATE) 0.05 % cream Apply topically 1-2 times daily to the right vaginal area over the labia. Yes Korey Snyder MD   sertraline (ZOLOFT) 100 MG tablet Take 1 tablet by mouth daily Yes Korey Snyder MD   metFORMIN (GLUCOPHAGE) 500 MG tablet Take 1 tablet by mouth daily With supper Yes Korey Snyder MD   losartan (COZAAR) 50 MG tablet Take 1 tablet by mouth daily Yes Korey Snyder MD   levothyroxine (SYNTHROID) 25 MCG tablet Take 1 tablet by mouth Daily Yes Korey Snyder MD   lansoprazole (PREVACID) 15 MG delayed release capsule Take 1 capsule by mouth daily Yes Korey Snyder MD   glipiZIDE (GLUCOTROL XL) 5 MG extended release tablet TAKE 1 TABLET BY MOUTH  DAILY WITH BREAKFAST Yes Korey Snyder MD   donepezil (ARICEPT) 10 MG tablet TAKE 1 TABLET BY MOUTH AT  NIGHT Yes Korey Snyder MD   B-D ULTRA-FINE 33 LANCETS MISC TRUEplus Lancets 33 gauge Yes Korey Snyder MD   blood glucose monitor strips Test 1 time daily and as needed for symptoms of irregular glucose.  Yes Korey Snyder MD   tolterodine (DETROL LA) 4 MG extended release capsule take 1 capsule by mouth once daily Yes Historical Provider, MD   fluticasone (FLONASE) 50 MCG/ACT nasal spray 1 spray by Each Nostril route daily Yes Historical Provider, MD   blood glucose test strips (ASCENSIA AUTODISC VI;ONE TOUCH ULTRA TEST VI) strip 1 each by In Vitro route daily As needed. Yes Cam Quinones MD   Cholecalciferol (VITAMIN D-3 SUPER STRENGTH) 50 MCG (2000 UT) TABS Take 1 tablet by mouth daily Yes Historical Provider, MD   Blood Glucose Monitoring Suppl (TRUE METRIX AIR GLUCOSE METER) KEVON True Metrix Glucose Meter   USE AS DIRECTED TO TEST SUGAR Yes Historical Provider, MD   Probiotic Product (PROBIOTIC PO) Take 1 capsule by mouth Yes Historical Provider, MD   docusate sodium (COLACE) 100 MG capsule Take 100 mg by mouth daily  Yes Historical Provider, MD         Past Medical History:   Diagnosis Date    Anemia     285.9    Cerebrovascular disease, unspecified     437.9    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 pulmonary tuberculosis without diagnosis     Positive TB Converter    DDD (degenerative disc disease), lumbar     724.3/M54.40    Degenerative joint disease of spine     C/T/L    Dementia ([facility]     294.20    DM (diabetes mellitus) ([facility]     DM2 (diabetes mellitus, type 2) ([facility]     Dysphagia     Gilbert's syndrome     elevated bili    Hiatal hernia     with GERD and presbyesophagus    Hyperlipidemia     Hypertension     Hypothyroid     goiter    Hypothyroidism, iatrogenic     Macular degeneration     OU, Dr. Jaden Pradhan of upper limb, left ([facility] 08/22/2017    Dr. Dixie Martin    Multinodular goiter (nontoxic)     Osteoporosis     with compression fx T8/12,L2/5,  805.8/M48. 50Xa    Peptic reflux disease     Tremor     R25.1       Past Surgical History:   Procedure Laterality Date    CATARACT REMOVAL WITH IMPLANT Bilateral 2011    Dr. Dunbar Erik  10/21/2015    Dr. Petey Todd, normal    OTHER SURGICAL HISTORY      Lumbar epidurals, Dr. Samantha Connell ENDOSCOPY  02/08/2010 Dr. Gonsalez Males, Hiatal hernia         Family History   Problem Relation Age of Onset    Cancer Mother         throat    Cancer Father         bladder    Breast Cancer Sister     Diabetes Paternal Grandmother     Arthritis Paternal Grandmother     Breast Cancer Other         cousin       CareTeam (Including outside providers/suppliers regularly involved in providing care):   Patient Care Team:  Liliana Shannon MD as PCP - General (Family Medicine)  Liliana Shannon MD as PCP - [facility]  Kaycee Diop MD as Consulting Physician (Ophthalmology: Vitreoretinal Specialist)  Raul Low MD as Surgeon (Ophthalmology)  Yolis Otero (Dermatology)  Rosalba Messer DO as Surgeon (Plastic Surgery)    Wt Readings from Last 3 Encounters:   07/12/21 109 lb (49.4 kg)   06/25/21 109 lb 12.8 oz (49.8 kg)   04/29/21 110 lb (49.9 kg)     Vitals:    07/12/21 1552   BP: 128/68   Site: Right Upper Arm   Position: Sitting   Cuff Size: Medium Adult   Pulse: 100   Temp: 98.6 °F (37 °C)   TempSrc: Temporal   SpO2: 96%   Weight: 109 lb (49.4 kg)   Height: 4' 8\" (1.422 m)     Body mass index is 24.44 kg/m². Based upon direct observation of the patient, evaluation of cognition reveals recent memory intact, remote memory impaired.     General Appearance: alert and oriented to person, place and time, well developed and well- nourished, in no acute distress  Skin: warm and dry, no rash or erythema  Head: normocephalic and atraumatic  Eyes: pupils equal, round, and reactive to light, extraocular eye movements intact, conjunctivae normal  ENT: tympanic membrane, external ear and ear canal normal bilaterally, nose without deformity, nasal mucosa and turbinates normal without polyps  Neck: supple and non-tender without mass, no thyromegaly or thyroid nodules, no cervical lymphadenopathy  Pulmonary/Chest: clear to auscultation bilaterally- no wheezes, rales or rhonchi, normal air movement, no respiratory distress  Cardiovascular: normal rate, regular rhythm, normal S1 and S2, no murmurs, rubs, clicks, or gallops, distal pulses intact, no carotid bruits  Abdomen: soft, non-tender, non-distended, normal bowel sounds, no masses or organomegaly  Pelvic: normal external genitalia, vulva, vagina, cervix, uterus and adnexa  Extremities: no cyanosis, clubbing or edema  Musculoskeletal: normal range of motion, no joint swelling, deformity or tenderness  Neurologic: reflexes normal and symmetric, no cranial nerve deficit, gait, coordination and speech normal    Patient's complete Health Risk Assessment and screening values have been reviewed and are found in Flowsheets. The following problems were reviewed today and where indicated follow up appointments were made and/or referrals ordered. Positive Risk Factor Screenings with Interventions:            General Health and ACP:  General  In general, how would you say your health is?: Fair  In the past 7 days, have you experienced any of the following?  New or Increased Pain, New or Increased Fatigue, Loneliness, Social Isolation, Stress or Anger?: None of These  Do you get the social and emotional support that you need?: Yes  Do you have a Living Will?: Yes  Advance Directives     Power of [address] [place] [address] Will ACP-Advance Directive ACP-Power of     Not on File Not on File Not on File Not on File      General Health Risk Interventions:  · No Living Will: Advance Care Planning addressed with patient today    Health Habits/Nutrition:  Health Habits/Nutrition  Do you exercise for at least 20 minutes 2-3 times per week?: (!) No  Have you lost any weight without trying in the past 3 months?: (!) Yes  Do you eat only one meal per day?: No  Have you seen the dentist within the past year?: Yes  Body mass index: 24.43  Health Habits/Nutrition Interventions:  · Nutritional issues:  memory changes and bowel changes taking effect    Hearing/Vision:  No exam data present  Hearing/Vision  Do you or your family notice any trouble with your hearing that hasn't been managed with hearing aids?: (!) Yes  Do you have difficulty driving, watching TV, or doing any of your daily activities because of your eyesight?: (!) Yes  Have you had an eye exam within the past year?: Yes  Hearing/Vision Interventions:  · Hearing concerns:  no further interventions planned. ADL:  ADLs  In the past 7 days, did you need help from others to perform any of the following everyday activities? Eating, dressing, grooming, bathing, toileting, or walking/balance?: (!) Walking/Balance  In the past 7 days, did you need help from others to take care of any of the following? Laundry, housekeeping, banking/finances, shopping, telephone use, food preparation, transportation, or taking medications?: [employer], Housekeeping, Banking/Finances, Shopping, Food Preparation, Transportation, Taking Medications  ADL Interventions:  · daughters helping with this issue as ADL abilities decline.      Personalized Preventive Plan   Current Health Maintenance Status  Immunization History   Administered Date(s) Administered    COVID-19, Bravo Peter, PF, 30mcg/0.3mL 01/21/2021, 02/11/2021    Influenza A (G2R3-84) Vaccine PF IM 12/29/2009    Influenza Vaccine, unspecified formulation 10/13/2009, 10/21/2010, 10/10/2011, 09/11/2012, 09/16/2013, 11/04/2014, 11/05/2014, 10/03/2018    Influenza Virus Vaccine 10/13/2009, 10/21/2010, 10/10/2011, 09/11/2012, 09/16/2013, 11/04/2014, 11/05/2014, 10/03/2018    Influenza, High-dose, Quadv, 65 yrs +, IM (Fluzone) 09/14/2020    Pneumococcal Conjugate 13-valent (Mcqufwi19) 12/21/2017    Pneumococcal Polysaccharide (Bllnhreia12) 01/07/2016        Health Maintenance   Topic Date Due    DTaP/Tdap/Td vaccine (1 - Tdap) Never done    Shingles Vaccine (1 of 2) Never done   [employer] Visit (AWV)  Never done    Flu vaccine (1) 09/01/2021    TSH testing  04/21/2022    Potassium monitoring  06/22/2022    Creatinine monitoring  06/22/2022    Pneumococcal 65+ years Vaccine  Completed    COVID-19 Vaccine  Completed    Hepatitis A vaccine  Aged Out    Hib vaccine  Aged Out    Meningococcal (ACWY) vaccine  Aged Out     Recommendations for [place] Due: see orders and patient instructions/AVS.  . Recommended screening schedule for the next 5-10 years is provided to the patient in written form: see Patient Instructions/AVS.    Hilario Patel was seen today for medicare awv. Diagnoses and all orders for this visit:    Routine general medical examination at a health care facility  Overall Hilario Patel is doing fair but developing problems of advancing age. Depends on family for most ADLs. Memory impaired (ST mostly)   Age appropriate HM topics reviewed and updated where agreeable. Vaccines reviewed and updated where agreeable. Age appropriate anticipatory guidance discussed. Encouraged to work on [employer] and exercise strategies. Opportunity to ask questions and answers provided as able. Recommend RTO in 1 year for annual physical.     Lichen sclerosus  -     clobetasol (TEMOVATE) 0.05 % cream; Apply topically 1-2 times daily to the right vaginal area over the labia. Patient seen today for vaginal pain and to complete AWV. Vaginal pain discussed in past, and daughters accompany her today confirming she is still complaing of this. We decided to do an exam to further understand the problem. Chaperoned exam with LPN Alecia parker and PGY2 Dr. Elisabeth Freeman revealed the area of pain was right labia minor which was fused with the labia majora - looks like lichen sclerosus type changes  She also has likely uterine prolapse but on exam is not protruding from vagina. No masses felt. Looks like vaginal polyp, not friable, at 6 oclock, 1 cm or so diam. Many external hemorrhoid and internal hemorrhoid protruding. No mass noted.      Given above we will reduce her stool softener to QOD to

## 2021-07-13 DIAGNOSIS — L90.0 LICHEN SCLEROSUS: ICD-10-CM

## 2021-07-13 DIAGNOSIS — R30.0 DYSURIA: ICD-10-CM

## 2021-07-15 LAB
ORGANISM: ABNORMAL
URINE CULTURE, ROUTINE: ABNORMAL
URINE CULTURE, ROUTINE: ABNORMAL

## 2021-07-15 RX ORDER — CEPHALEXIN 500 MG/1
500 CAPSULE ORAL 3 TIMES DAILY
Qty: 21 CAPSULE | Refills: 0 | Status: SHIPPED | OUTPATIENT
Start: 2021-07-15 | End: 2021-07-22

## 2021-09-23 ENCOUNTER — TELEPHONE (OUTPATIENT)
Dept: FAMILY MEDICINE CLINIC | Age: 86
End: 2021-09-23

## 2021-09-30 ENCOUNTER — OFFICE VISIT (OUTPATIENT)
Dept: FAMILY MEDICINE CLINIC | Age: 86
End: 2021-09-30
Payer: MEDICARE

## 2021-09-30 VITALS
HEART RATE: 98 BPM | OXYGEN SATURATION: 97 % | WEIGHT: 105 LBS | BODY MASS INDEX: 23.62 KG/M2 | TEMPERATURE: 97.8 F | HEIGHT: 56 IN | SYSTOLIC BLOOD PRESSURE: 134 MMHG | DIASTOLIC BLOOD PRESSURE: 64 MMHG

## 2021-09-30 DIAGNOSIS — M54.50 ACUTE BILATERAL LOW BACK PAIN WITHOUT SCIATICA: ICD-10-CM

## 2021-09-30 DIAGNOSIS — I10 ESSENTIAL HYPERTENSION: ICD-10-CM
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
## 2021-09-30 DIAGNOSIS — F41.9 ANXIETY: ICD-10-CM

## 2021-09-30 DIAGNOSIS — G30.1 LATE ONSET ALZHEIMER'S DISEASE WITHOUT BEHAVIORAL DISTURBANCE (HCC): ICD-10-CM

## 2021-09-30 DIAGNOSIS — F02.80 LATE ONSET ALZHEIMER'S DISEASE WITHOUT BEHAVIORAL DISTURBANCE (HCC): ICD-10-CM

## 2021-09-30 PROCEDURE — G0008 ADMIN INFLUENZA VIRUS VAC: HCPCS | Performed by: FAMILY MEDICINE

## 2021-09-30 PROCEDURE — 1090F PRES/ABSN URINE INCON ASSESS: CPT | Performed by: FAMILY MEDICINE

## 2021-09-30 PROCEDURE — G8427 DOCREV CUR MEDS BY ELIG CLIN: HCPCS | Performed by: FAMILY MEDICINE

## 2021-09-30 PROCEDURE — 99214 OFFICE O/P EST MOD 30 MIN: CPT | Performed by: FAMILY MEDICINE

## 2021-09-30 PROCEDURE — 90694 VACC AIIV4 NO PRSRV 0.5ML IM: CPT | Performed by: FAMILY MEDICINE

## 2021-09-30 PROCEDURE — 4040F PNEUMOC VAC/ADMIN/RCVD: CPT | Performed by: FAMILY MEDICINE

## 2021-09-30 PROCEDURE — G8420 CALC BMI NORM PARAMETERS: HCPCS | Performed by: FAMILY MEDICINE

## 2021-09-30 PROCEDURE — 1123F ACP DISCUSS/DSCN MKR DOCD: CPT | Performed by: FAMILY MEDICINE

## 2021-09-30 PROCEDURE — 1036F TOBACCO NON-USER: CPT | Performed by: FAMILY MEDICINE

## 2021-09-30 NOTE — PROGRESS NOTES
[facility]  Office Progress Note - Dr. Adalberto Lewis  9/30/21    CC:   Chief Complaint   Patient presents with   [facility] Ariel Waggoner (slid) out of bed about a month ago. Pt states she hurt her back and left shoulder        /64 (Site: Right Upper Arm, Position: Sitting, Cuff Size: Medium Adult)   Pulse 98   Temp 97.8 °F (36.6 °C) (Temporal)   Ht 4' 8\" (1.422 m)   Wt 105 lb (47.6 kg)   SpO2 97%   BMI 23.54 kg/m²   Wt Readings from Last 3 Encounters:   09/30/21 105 lb (47.6 kg)   07/12/21 109 lb (49.4 kg)   06/25/21 109 lb 12.8 oz (49.8 kg)       HPI:    She complains of back pain. The back is worst in the morning when she gets out of bed. The pain subsides as she moves throughout the day. Has been able to do her normal ADLs. She has been taking Advil and Tylenol on and off if she needs it. She prefers heat and cold compresses. She complains of night sweats on and off when she has bad dreams. She states that she \"wakes up drenched\" but when she's up in the morning, her daughter states that her clothes are completely dry. She also states that the clothes are dried in seconds. Her daughter also states that she gives her a snack before bed in case she is sweating due to low glucose. She is going to be transitioning to an assisted living facility. Has paperwork for that today. We filled out a DNR form together. This is consistent with her wishes. Has no complaints of vaginal pain which was very bothersome there for the last couple of visits. Continues Aricept for dementia. Memory seems stable today. Making jokes. In a good mood. Following conversation appropriately. Hard of hearing. She does need regular help with ADLs. Needs prescription medication management. Sometimes some difficulty with bathroom habits. Occasional accidents. Mood seems better on the higher dose of Zoloft. Family happy.   Less nighttime awakenings. _________________________________________________________    Assessment / Sonal Vivas was seen today for fall. Diagnoses and all orders for this visit:    Fall, initial encounter  She slid out of bed. She was no worse for the wear. She has had some muscle tenderness over time but this is not affecting ADLs. This happened over a month ago and she is doing fine. Continue same management. Late onset Alzheimer's disease without behavioral disturbance (HCC)  Continue Aricept. Memory remains stable recently. Mood is better. Less nighttime awakenings and hallucinations. Essential hypertension  Blood pressure controlled. Continue same. Anxiety  Improved. Continue Zoloft 100 mg. No obvious side effects. Acute bilateral low back pain without sciatica  As in fall above. Other orders  -     INFLUENZA, QUADV, ADJUVANTED, 72 YRS =, IM, PF, PREFILL SYR, 0.5ML (FLUAD)    Patient will be transitioning to assisted care facility. We will fill out paperwork summarizing medical history. Filled out DNR form today which is signed by patient and consistent with her wishes. Return in about 6 months (around 3/30/2022). or as scheduled. Patient counseled to follow up sooner or seek more acute care if symptoms worsening or not improving according to plan. Electronically signed by Joe Bull MD on 9/30/2021    _________________________________________________________  Current Outpatient Medications on File Prior to Visit   Medication Sig Dispense Refill    loratadine (CLARITIN) 10 MG tablet Take 10 mg by mouth daily      clobetasol (TEMOVATE) 0.05 % cream Apply topically 1-2 times daily to the right vaginal area over the labia.  45 g 0    sertraline (ZOLOFT) 100 MG tablet Take 1 tablet by mouth daily 90 tablet 1    metFORMIN (GLUCOPHAGE) 500 MG tablet Take 1 tablet by mouth daily With supper 90 tablet 3    losartan (COZAAR) 50 MG tablet Take 1 tablet by mouth daily 90 tablet 3  levothyroxine (SYNTHROID) 25 MCG tablet Take 1 tablet by mouth Daily 90 tablet 3    lansoprazole (PREVACID) 15 MG delayed release capsule Take 1 capsule by mouth daily 90 capsule 3    glipiZIDE (GLUCOTROL XL) 5 MG extended release tablet TAKE 1 TABLET BY MOUTH  DAILY WITH BREAKFAST 90 tablet 3    donepezil (ARICEPT) 10 MG tablet TAKE 1 TABLET BY MOUTH AT  NIGHT 90 tablet 3    B-D ULTRA-FINE 33 LANCETS MISC TRUEplus Lancets 33 gauge 100 each 1    blood glucose monitor strips Test 1 time daily and as needed for symptoms of irregular glucose. 50 strip 3    fluticasone (FLONASE) 50 MCG/ACT nasal spray 1 spray by Each Nostril route daily      blood glucose test strips (ASCENSIA AUTODISC VI;ONE TOUCH ULTRA TEST VI) strip 1 each by In Vitro route daily As needed. 100 each 2    Cholecalciferol (VITAMIN D-3 SUPER STRENGTH) 50 MCG (2000 UT) TABS Take 1 tablet by mouth daily      Blood Glucose Monitoring Suppl (TRUE METRIX AIR GLUCOSE METER) KEVON True Metrix Glucose Meter   USE AS DIRECTED TO TEST SUGAR      Probiotic Product (PROBIOTIC PO) Take 1 capsule by mouth      docusate sodium (COLACE) 100 MG capsule Take 100 mg by mouth daily       tolterodine (DETROL LA) 4 MG extended release capsule take 1 capsule by mouth once daily (Patient not taking: Reported on 9/30/2021)       No current facility-administered medications on file prior to visit.        Patient Active Problem List   Diagnosis Code    Macular degeneration of both eyes H35.30    Chronic anemia D64.9    Type 2 diabetes mellitus without complication, without long-term current use of insulin ([facility] [address] E11.9    Hypothyroidism E03.9    Gastroesophageal reflux disease K21.9    Essential hypertension I10    Late onset Alzheimer's disease without behavioral disturbance (HCC) G30.1, F02.80    Anxiety F41.9    Age-related osteoporosis with current pathological fracture M80.00XA     _________________________________________________________  Past Medical History:   Diagnosis Date    Anemia     285.9    Cerebrovascular disease, unspecified     437.9    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 pulmonary tuberculosis without diagnosis     Positive TB Converter    DDD (degenerative disc disease), lumbar     724.3/M54.40    Degenerative joint disease of spine     C/T/L    Dementia (HCC)     294.20    DM (diabetes mellitus) ([facility]     DM2 [facility]diabetes mellitus, type 2) ([facility]     Dysphagia     Gilbert's syndrome     elevated bili    Hiatal hernia     with GERD and presbyesophagus    Hyperlipidemia     Hypertension     Hypothyroid     goiter    Hypothyroidism, iatrogenic     Macular degeneration     [facility], Dr. Juwan Diana [facility] 08/22/2017    Dr. Amaya Corporal    Multinodular goiter (nontoxic)     Osteoporosis     with compression fx T8/12,L2/5,  805.8/M48. 50Xa    Peptic reflux disease     Tremor     R25.1       Family History   Problem Relation Age of Onset    Cancer Mother         throat    Cancer Father         bladder    Breast Cancer Sister     Diabetes Paternal Grandmother     Arthritis Paternal Grandmother     Breast Cancer Other         cousin       Past Surgical History:   Procedure Laterality Date    CATARACT REMOVAL WITH IMPLANT Bilateral 2011    Dr. Kj Jimenez  10/21/2015    Dr. Pfeiffer, normal    OTHER SURGICAL HISTORY      Lumbar epidurals, Dr. Dumont [place]  02/08/2010    Dr. SULLIVAN [facility], Hiatal hernia       Social History     Tobacco Use    Smoking status: Never Smoker    Smokeless tobacco: Never Used   Substance Use Topics    Alcohol use: Yes     Comment: occ    Drug use: Never       Chart reviewed and updated where appropriate for PMH, Fam, and Soc Hx.  _________________________________________________________  ROS: POSITIVE: As in the HPI. Otherwise Pertinent negatives are negative.

## 2021-10-06 ENCOUNTER — TELEPHONE (OUTPATIENT)
Dept: FAMILY MEDICINE CLINIC | Age: 86
End: 2021-10-06

## 2021-10-11 ENCOUNTER — PATIENT MESSAGE (OUTPATIENT)
Dept: FAMILY MEDICINE CLINIC | Age: 86
End: 2021-10-11

## 2021-10-12 NOTE — TELEPHONE ENCOUNTER
From: Amie Cohn  To: Nay Garduno MD  Sent: 10/11/2021 3:43 PM EDT  Subject: Non-Urgent Medical Question    This is Stephani Hackett daughter of Marceil Landau . At urology appt we were advised the meds were not managing her incontinence as expected. He recommended gynecology referral to discuss pessary or Botox. As family we have decided that is not appropriate for mom at this time. Mom very sure she will not have any kind of intervention from gynecologist. Fernanda Lin at office of Dr Gino Lama gave 30 days of [facility] he believed mom should not be having any incontinence with medication trials. We observed very manageable incontinence during the time mom was using Myrbetriq. She did have some leaking used a light to med pad , but she did not have flooding accidents. Would you consider prescribing Myrbetriq and helping us manage her incontinence ?  Thanks

## 2021-10-15 NOTE — TELEPHONE ENCOUNTER
Left Vm at Dr Robert Degroot asking if any complications taking Myrbetriq.   Dr. Gregor Larios Ph# [phone]

## 2021-10-15 NOTE — TELEPHONE ENCOUNTER
I don't mind prescribing myrbetriq, but I need to know if Reema Calvin had any problems from the medication such as high blood pressure - which can be a side effect. If the urology office changed the Myrbetriq only because they thought a different med might work better, then I would be ok with switching back to it. Please check with urology office and see if she had any complications on Myrbetriq.

## 2021-10-18 NOTE — TELEPHONE ENCOUNTER
Per Darleen Mckeon @ Dr. Mercedes Phillips office  Tyler Councilman took started [facility] 10/18/2020 and stopped it 01/18/2021 -She was just not seeing any improvement. (No allergies, no increased BP)    Started Detrol 01/18/21 did not see improvement. 10/06/21 started new med, Gemtesa 75 mg once daily -Tyler Councilman reported to Dr. Wayne Crowder that she is not seeing improvement, but wants to keep taking it for 1 more month.

## 2021-10-22 ENCOUNTER — TELEPHONE (OUTPATIENT)
Dept: FAMILY MEDICINE CLINIC | Age: 86
End: 2021-10-22

## 2021-10-22 NOTE — TELEPHONE ENCOUNTER
Yolanda Messer - Daughter    She is calling about the OTC meds that her mom uses. Now that she is admitted at [facility], they need orders for all of them. They need that faxed to them at the fax number - [phone]  How is the best way to handle this? Do you need all the medication and supplement added to the chart? Would a list of all the meds with the directions be appropriate?

## 2021-10-25 LAB
SARS-COV-2: NOT DETECTED
SOURCE: NORMAL

## 2021-10-26 RX ORDER — FLUTICASONE PROPIONATE 50 MCG
1 SPRAY, SUSPENSION (ML) NASAL DAILY
Qty: 16 G | Refills: 5 | Status: CANCELLED | OUTPATIENT
Start: 2021-10-26

## 2021-10-26 RX ORDER — L.ACID,FERM,PLA,RHA/B.BIF,LONG 126 MG
TABLET, DELAYED AND EXTENDED RELEASE ORAL
Qty: 100 TABLET | Refills: 5 | Status: CANCELLED | OUTPATIENT
Start: 2021-10-26

## 2021-10-26 RX ORDER — IBUPROFEN 600 MG/1
600 TABLET ORAL DAILY PRN
Qty: 100 TABLET | Refills: 5 | Status: CANCELLED | OUTPATIENT
Start: 2021-10-26

## 2021-10-26 RX ORDER — SIMETHICONE 80 MG
80 TABLET,CHEWABLE ORAL DAILY PRN
Qty: 100 TABLET | Refills: 5 | Status: CANCELLED | OUTPATIENT
Start: 2021-10-26

## 2021-10-26 RX ORDER — CHOLECALCIFEROL (VITAMIN D3) 50 MCG
1 TABLET ORAL DAILY
Qty: 100 TABLET | Refills: 5 | Status: CANCELLED | OUTPATIENT
Start: 2021-10-26

## 2021-10-26 RX ORDER — PYRIDOXINE HCL (VITAMIN B6) 100 MG
500 TABLET ORAL DAILY
Qty: 100 CAPSULE | Refills: 5 | Status: CANCELLED | OUTPATIENT
Start: 2021-10-26

## 2021-10-26 NOTE — TELEPHONE ENCOUNTER
I have all of her otc products ready to print.   They need faxed to [facility] at the number below:       Fax - [phone]

## 2021-10-26 NOTE — TELEPHONE ENCOUNTER
Oh sorry - I thought you just had a list of meds - that's all I want - a list of meds and how often she takes them - not actual Rxs.

## 2021-10-26 NOTE — TELEPHONE ENCOUNTER
T[facility] said this is not something I can do. But I have the scripts under PRINT if you hit the button on them, they should print on your script printer.

## 2021-10-26 NOTE — TELEPHONE ENCOUNTER
Dirk Caceresin will bring that down to you, but according to her daughter, the nursing home was calling them Orders and she assumed each one needed sent individually. I made you a list and placed it at your computer.

## 2021-10-27 ENCOUNTER — TELEPHONE (OUTPATIENT)
Dept: FAMILY MEDICINE CLINIC | Age: 86
End: 2021-10-27

## 2021-10-27 DIAGNOSIS — Z12.31 ENCOUNTER FOR MAMMOGRAM TO ESTABLISH BASELINE MAMMOGRAM: Primary | ICD-10-CM

## 2021-10-27 NOTE — TELEPHONE ENCOUNTER
[facility] calling for an order for a screening mammogram.  She is scheduled for a routine on 11-.

## 2021-10-28 NOTE — TELEPHONE ENCOUNTER
Last Appointment:  9/30/2021  Future Appointments   Date Time Provider Meet Dumont   3/31/2022  2:00 PM Justino Abad MD [facility]      Jessy was sent to [facility], pt wants this sent to [address] [place] [address]. Pended.

## 2021-10-29 NOTE — TELEPHONE ENCOUNTER
Notified daughter Fawad Kong. She states that pt's sister  of breast cancer and her daughter has recently been diagnosed with breast cancer at 72. She appreciates you putting in the order and states that pt will probably continue to get screenings. Faxed order to [facility].

## 2021-11-17 NOTE — RESULT ENCOUNTER NOTE
Please advise patient of normal mammogram.  If she would like to continue screening, this could be done again in 1 year. This would be up to the patient.

## 2021-12-13 DIAGNOSIS — F02.80 LATE ONSET ALZHEIMER'S DISEASE WITHOUT BEHAVIORAL DISTURBANCE (HCC): ICD-10-CM

## 2021-12-13 DIAGNOSIS — G30.1 LATE ONSET ALZHEIMER'S DISEASE WITHOUT BEHAVIORAL DISTURBANCE (HCC): ICD-10-CM

## 2021-12-14 RX ORDER — SERTRALINE HYDROCHLORIDE 100 MG/1
100 TABLET, FILM COATED ORAL DAILY
Qty: 90 TABLET | Refills: 3 | Status: SHIPPED | OUTPATIENT
Start: 2021-12-14

## 2021-12-14 NOTE — TELEPHONE ENCOUNTER
Last Appointment:  9/30/2021  Future Appointments   Date Time Provider Meet Dumont   3/31/2022  2:00 PM Jaime Juares  [address] [place] [address]

## 2021-12-17 ENCOUNTER — TELEPHONE (OUTPATIENT)
Dept: FAMILY MEDICINE CLINIC | Age: 86
End: 2021-12-17

## 2021-12-17 NOTE — TELEPHONE ENCOUNTER
Juliana Odom - Daughter   [phone]      She is calling to get an appointment with you for her mom for night swetting. This is going on every night and she is not getting a good night's sleep because of it. Do you have a place I could schedule her? You can access the [facility] Patient Portal, offered by [facility], by registering with the following website: [email or web address]

## 2022-01-07 DIAGNOSIS — L90.0 LICHEN SCLEROSUS: ICD-10-CM

## 2022-01-07 RX ORDER — SULFAMETHOXAZOLE AND TRIMETHOPRIM 800; 160 MG/1; MG/1
1 TABLET ORAL 2 TIMES DAILY
Qty: 10 TABLET | Refills: 0 | Status: SHIPPED | OUTPATIENT
Start: 2022-01-07 | End: 2022-01-12

## 2022-01-10 RX ORDER — CLOBETASOL PROPIONATE 0.5 MG/G
CREAM TOPICAL
Qty: 45 G | Refills: 0 | Status: SHIPPED
Start: 2022-01-10 | End: 2022-01-13

## 2022-01-10 RX ORDER — MIRABEGRON 25 MG/1
TABLET, FILM COATED, EXTENDED RELEASE ORAL
Qty: 90 TABLET | Refills: 3 | Status: SHIPPED | OUTPATIENT
Start: 2022-01-10

## 2022-01-10 NOTE — TELEPHONE ENCOUNTER
Last Appointment:  9/30/2021  Future Appointments   Date Time Provider Meet Dumont   1/13/2022  2:20 PM Roslyn Robb  [address] [place] [address]   3/31/2022  2:00 PM Roslyn Robb  [address]

## 2022-01-10 NOTE — TELEPHONE ENCOUNTER
Last Appointment:  9/30/2021  Future Appointments   Date Time Provider Meet Dumont   1/13/2022  2:20 PM Tran Prasad  W [address] [place] [address]   3/31/2022  2:00 PM Tran Prasad  [address] [place] [address]

## 2022-01-13 ENCOUNTER — OFFICE VISIT (OUTPATIENT)
Dept: FAMILY MEDICINE CLINIC | Age: 87
End: 2022-01-13
Payer: MEDICARE

## 2022-01-13 VITALS
BODY MASS INDEX: 21.59 KG/M2 | DIASTOLIC BLOOD PRESSURE: 60 MMHG | SYSTOLIC BLOOD PRESSURE: 128 MMHG | OXYGEN SATURATION: 92 % | TEMPERATURE: 98 F | WEIGHT: 96 LBS | HEART RATE: 84 BPM | HEIGHT: 56 IN

## 2022-01-13 DIAGNOSIS — R61 NIGHT SWEATS: ICD-10-CM

## 2022-01-13 DIAGNOSIS — R13.19 ESOPHAGEAL DYSPHAGIA: ICD-10-CM

## 2022-01-13 DIAGNOSIS — G89.29 CHRONIC MIDLINE LOW BACK PAIN WITHOUT SCIATICA: Primary | ICD-10-CM

## 2022-01-13 DIAGNOSIS — E11.9 TYPE 2 DIABETES MELLITUS WITHOUT COMPLICATION, WITHOUT LONG-TERM CURRENT USE OF INSULIN (HCC): ICD-10-CM

## 2022-01-13 DIAGNOSIS — F02.80 LATE ONSET ALZHEIMER'S DISEASE WITHOUT BEHAVIORAL DISTURBANCE (HCC): ICD-10-CM

## 2022-01-13 DIAGNOSIS — G30.1 LATE ONSET ALZHEIMER'S DISEASE WITHOUT BEHAVIORAL DISTURBANCE (HCC): ICD-10-CM

## 2022-01-13 DIAGNOSIS — M54.50 CHRONIC MIDLINE LOW BACK PAIN WITHOUT SCIATICA: Primary | ICD-10-CM

## 2022-01-13 LAB
ALBUMIN SERPL-MCNC: 4.6 G/DL (ref 3.5–5.2)
ALP BLD-CCNC: 101 U/L (ref 35–104)
ALT SERPL-CCNC: 15 U/L (ref 0–32)
ANION GAP SERPL CALCULATED.3IONS-SCNC: 12 MMOL/L (ref 7–16)
AST SERPL-CCNC: 26 U/L (ref 0–31)
BASOPHILS ABSOLUTE: 0.07 E9/L (ref 0–0.2)
BASOPHILS RELATIVE PERCENT: 0.8 % (ref 0–2)
BILIRUB SERPL-MCNC: 0.7 MG/DL (ref 0–1.2)
BUN BLDV-MCNC: 18 MG/DL (ref 6–23)
C-REACTIVE PROTEIN: 0.3 MG/DL (ref 0–0.4)
CALCIUM SERPL-MCNC: 10.4 MG/DL (ref 8.6–10.2)
CHLORIDE BLD-SCNC: 95 MMOL/L (ref 98–107)
CO2: 25 MMOL/L (ref 22–29)
CREAT SERPL-MCNC: 1.3 MG/DL (ref 0.5–1)
EOSINOPHILS ABSOLUTE: 0.22 E9/L (ref 0.05–0.5)
EOSINOPHILS RELATIVE PERCENT: 2.6 % (ref 0–6)
GFR AFRICAN AMERICAN: 46
GFR NON-AFRICAN AMERICAN: 38 ML/MIN/1.73
GLUCOSE BLD-MCNC: 101 MG/DL (ref 74–99)
HBA1C MFR BLD: 6.1 % (ref 4–5.6)
HCT VFR BLD CALC: 34.1 % (ref 34–48)
HEMOGLOBIN: 10.8 G/DL (ref 11.5–15.5)
IMMATURE GRANULOCYTES #: 0.01 E9/L
IMMATURE GRANULOCYTES %: 0.1 % (ref 0–5)
LYMPHOCYTES ABSOLUTE: 2.63 E9/L (ref 1.5–4)
LYMPHOCYTES RELATIVE PERCENT: 30.8 % (ref 20–42)
MCH RBC QN AUTO: 31.4 PG (ref 26–35)
MCHC RBC AUTO-ENTMCNC: 31.7 % (ref 32–34.5)
MCV RBC AUTO: 99.1 FL (ref 80–99.9)
MONOCYTES ABSOLUTE: 0.71 E9/L (ref 0.1–0.95)
MONOCYTES RELATIVE PERCENT: 8.3 % (ref 2–12)
NEUTROPHILS ABSOLUTE: 4.9 E9/L (ref 1.8–7.3)
NEUTROPHILS RELATIVE PERCENT: 57.4 % (ref 43–80)
PDW BLD-RTO: 12.2 FL (ref 11.5–15)
PLATELET # BLD: 436 E9/L (ref 130–450)
PMV BLD AUTO: 10.9 FL (ref 7–12)
POTASSIUM SERPL-SCNC: 5.3 MMOL/L (ref 3.5–5)
RBC # BLD: 3.44 E12/L (ref 3.5–5.5)
SODIUM BLD-SCNC: 132 MMOL/L (ref 132–146)
TOTAL PROTEIN: 7.6 G/DL (ref 6.4–8.3)
TSH SERPL DL<=0.05 MIU/L-ACNC: 0.45 UIU/ML (ref 0.27–4.2)
WBC # BLD: 8.5 E9/L (ref 4.5–11.5)

## 2022-01-13 PROCEDURE — G8484 FLU IMMUNIZE NO ADMIN: HCPCS | Performed by: FAMILY MEDICINE

## 2022-01-13 PROCEDURE — G8428 CUR MEDS NOT DOCUMENT: HCPCS | Performed by: FAMILY MEDICINE

## 2022-01-13 PROCEDURE — G8420 CALC BMI NORM PARAMETERS: HCPCS | Performed by: FAMILY MEDICINE

## 2022-01-13 PROCEDURE — 4040F PNEUMOC VAC/ADMIN/RCVD: CPT | Performed by: FAMILY MEDICINE

## 2022-01-13 PROCEDURE — 99214 OFFICE O/P EST MOD 30 MIN: CPT | Performed by: FAMILY MEDICINE

## 2022-01-13 PROCEDURE — 1036F TOBACCO NON-USER: CPT | Performed by: FAMILY MEDICINE

## 2022-01-13 PROCEDURE — 1090F PRES/ABSN URINE INCON ASSESS: CPT | Performed by: FAMILY MEDICINE

## 2022-01-13 PROCEDURE — 1123F ACP DISCUSS/DSCN MKR DOCD: CPT | Performed by: FAMILY MEDICINE

## 2022-01-13 NOTE — PROGRESS NOTES
[facility]  Office Progress Note - Dr. Shira Costa  1/13/22    CC:   Chief Complaint   Patient presents with    Sweats     Night Sweats -every night    Weight Loss     Lost 9 lbs since last visit - Daughter reports swallowing issues inquires about swallow study, states assisted living has speech who comes in to do these tests.  Back Pain     Right sided back pain. /60 (Site: Right Upper Arm, Position: Sitting, Cuff Size: Medium Adult)   Pulse 84   Temp 98 °F (36.7 °C) (Temporal)   Ht 4' 8\" (1.422 m)   Wt 96 lb (43.5 kg)   SpO2 92%   BMI 21.52 kg/m²   Wt Readings from Last 3 Encounters:   01/13/22 96 lb (43.5 kg)   09/30/21 105 lb (47.6 kg)   07/12/21 109 lb (49.4 kg)       HPI: Nightsweats  Weight loss, unintentional  Some trouble swallowing - has been a long term problem but it is worsening. Not trying to swallowing some thing that she used to be able to swallow. She had an esophagram in the past and a \"crokscrew\" esophagus. Low back pain. Chronic. History of multiple compression fractures. Dementia present but mild, at least right now  Recalling many stories over time. Timeframe is not significant for current complaints. Diabetes has been well controlled at 80years old. Lab Results   Component Value Date    LABA1C 6.6 (H) 06/22/2021             _________________________________________________________    Assessment / Edilberto Justice was seen today for sweats, weight loss and back pain. Diagnoses and all orders for this visit:    Chronic midline low back pain without sciatica  -     XR LUMBAR SPINE (2-3 VIEWS); Future  X-ray report reviewed. Stable compression fractures. No new fractures or lesions seen. Late onset Alzheimer's disease without behavioral disturbance (HCC)  Stable. Yearly documentation. Living in nursing home/assisted living now.     Type 2 diabetes mellitus without complication, without long-term current use of insulin (HCC)  - Hemoglobin A1C; Future  We will update her A1c but I anticipate no significant changes. At 80 and with some weight loss recently probably due to deteriorating health for poor appetite, I do not think we will make any changes to diabetes control. Esophageal dysphagia  -     CBC Auto Differential; Future  -     Comprehensive Metabolic Panel; Future  -     Sedimentation Rate; Future  -     C-Reactive Protein; Future  -     TSH without Reflex; Future  Concerning. 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 This may be all that she is dealing with but her constellation of symptoms is concerning with weight loss, night sweats, dysphagia. I would like to get an esophagram and we will probably proceed with this after review of labs and x-ray of the lungs and back. Night sweats  -     CBC Auto Differential; Future  -     Comprehensive Metabolic Panel; Future  -     Sedimentation Rate; Future  -     C-Reactive Protein; Future  -     TSH without Reflex; Future  -     XR CHEST STANDARD (2 VW); Future  As above. Follow-up after work-up of labs, x-rays, esophagram.  Or as scheduled. Patient counseled to follow up sooner or seek more acute care if symptoms worsening or not improving according to plan.      Electronically signed by Gladis Jason MD on 1/13/2022    _________________________________________________________  Current Outpatient Medications on File Prior to Visit   Medication Sig Dispense Refill    MYRBETRIQ 25 MG TB24 TAKE 1 TABLET BY MOUTH  DAILY FOR URINARY  INCONTINENCE 90 tablet 3    sertraline (ZOLOFT) 100 MG tablet TAKE 1 TABLET BY MOUTH  DAILY 90 tablet 3    loratadine (CLARITIN) 10 MG tablet Take 10 mg by mouth daily      metFORMIN (GLUCOPHAGE) 500 MG tablet Take 1 tablet by mouth daily With supper 90 tablet 3    losartan (COZAAR) 50 MG tablet Take 1 tablet by mouth daily 90 tablet 3    levothyroxine (SYNTHROID) 25 MCG tablet Take 1 tablet by mouth Daily 90 tablet 3    lansoprazole (PREVACID) 15 MG delayed release capsule Take 1 capsule by mouth daily 90 capsule 3    glipiZIDE (GLUCOTROL XL) 5 MG extended release tablet TAKE 1 TABLET BY MOUTH  DAILY WITH BREAKFAST 90 tablet 3    donepezil (ARICEPT) 10 MG tablet TAKE 1 TABLET BY MOUTH AT  NIGHT 90 tablet 3    B-D ULTRA-FINE 33 LANCETS MISC TRUEplus Lancets 33 gauge 100 each 1    blood glucose monitor strips Test 1 time daily and as needed for symptoms of irregular glucose. 50 strip 3    fluticasone (FLONASE) 50 MCG/ACT nasal spray 1 spray by Each Nostril route daily      blood glucose test strips (ASCENSIA AUTODISC VI;ONE TOUCH ULTRA TEST VI) strip 1 each by In Vitro route daily As needed. 100 each 2    Cholecalciferol (VITAMIN D-3 SUPER STRENGTH) 50 MCG (2000 UT) TABS Take 1 tablet by mouth daily      Blood Glucose Monitoring Suppl (TRUE METRIX AIR GLUCOSE METER) KEVON True Metrix Glucose Meter   USE AS DIRECTED TO TEST SUGAR      Probiotic Product (PROBIOTIC PO) Take 1 capsule by mouth      docusate sodium (COLACE) 100 MG capsule Take 100 mg by mouth daily        No current facility-administered medications on file prior to visit.        Patient Active Problem List   Diagnosis Code    Macular degeneration of both eyes H35.30    Chronic anemia D64.9    Type 2 diabetes mellitus without complication, without long-term current use of insulin ([facility]) E11.9    Hypothyroidism E03.9    Gastroesophageal reflux disease K21.9    Essential hypertension I10    Late onset Alzheimer's disease without behavioral disturbance ([facility]) G30.1, F02.80    Anxiety F41.9    Age-related osteoporosis with current pathological fracture M80.00XA     _________________________________________________________  Past Medical History:   Diagnosis Date    Anemia     285.9    Cerebrovascular disease, unspecified     437.9    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 Bill Vaughn [facility] 08/[facility]2017    Dr. Rhonda Glover    Multinodular goiter (nontoxic)     Osteoporosis     with compression fx T8/12,L2/5,  805.8/M48. 50Xa    Peptic reflux disease     Tremor     R25.1       Family History   Problem Relation Age of Onset    Cancer Mother         throat    Cancer Father         bladder    Breast Cancer Sister     Diabetes Paternal Grandmother     Arthritis Paternal Grandmother     Breast Cancer Other         cousin       Past Surgical History:   Procedure Laterality Date    CATARACT REMOVAL WITH IMPLANT Bilateral 2011    Dr. Kevin Machuca  10/21/2015    Dr. Trina Bueno, normal    OTHER SURGICAL HISTORY      Lumbar epidurals, Dr. Jaime Hayden  02/08/2010    Dr. Rosendo Becker, Hiatal hernia       Social History     Tobacco Use    Smoking status: Never Smoker    Smokeless tobacco: Never Used   Substance Use Topics    Alcohol use: Yes     Comment: occ    Drug use: Never       Chart reviewed and updated where appropriate for PMH, Fam, and Soc Hx.  _________________________________________________________  ROS: POSITIVE: As in the HPI. Otherwise Pertinent negatives are negative.    __________________________________________________________  Physical Exam   Constitutional:    She is oriented to person, place, and time. She appears well-developed and well-nourished. Frail.   Eyes:    Conjunctivae are normal.    Pupils are equal, round, and reactive to light. EOMI. Cardiovascular:    Normal rate, regular rhythm and normal heart sounds. No murmur. No gallop and no friction rub. Pulmonary/Chest:    Effort normal and breath sounds normal.    Wheezes in the right lung fields. No rales or rhonchi. Abdominal:    Soft. Bowel sounds are normal.    No distension. No tenderness. Musculoskeletal:    Normal range of motion. No joint swelling noted. No peripheral edema. Skin:    Skin is warm and dry. No rashes, No lesions. Psychiatric:    She has a normal mood and affect. Short-term memory somewhat impaired. Hard of hearing. Normal groom and dress. No SI or HI.   ________________________________________________________    This note may have been created using dictation software.  Efforts were made to reduce errors, but some may persist.

## 2022-01-14 LAB — SEDIMENTATION RATE, ERYTHROCYTE: 3 MM/HR (ref 0–20)

## 2022-01-19 DIAGNOSIS — R63.4 WEIGHT LOSS: ICD-10-CM

## 2022-01-19 DIAGNOSIS — R13.19 ESOPHAGEAL DYSPHAGIA: Primary | ICD-10-CM

## 2022-01-20 ENCOUNTER — TELEPHONE (OUTPATIENT)
Dept: FAMILY MEDICINE CLINIC | Age: 87
End: 2022-01-20

## 2022-01-20 DIAGNOSIS — R30.0 DYSURIA: Primary | ICD-10-CM

## 2022-01-20 NOTE — TELEPHONE ENCOUNTER
Yoko from [facility] called to say that they won't be able to see pt until Monday 1/24/22 for speech therapy. She wanted to make sure that was ok?

## 2022-01-20 NOTE — TELEPHONE ENCOUNTER
Received fax from [facility]. Pt having hematuria, moderate - large leuks after ABX    Nurse inquiring about C&S order if you agree.

## 2022-01-24 LAB — URINE CULTURE, ROUTINE: NORMAL

## 2022-01-27 ENCOUNTER — HOSPITAL ENCOUNTER (OUTPATIENT)
Dept: GENERAL RADIOLOGY | Age: 87
Discharge: HOME | End: 2022-01-29
Payer: MEDICARE

## 2022-01-27 DIAGNOSIS — R63.4 WEIGHT LOSS: ICD-10-CM

## 2022-01-27 DIAGNOSIS — R13.19 ESOPHAGEAL DYSPHAGIA: ICD-10-CM

## 2022-01-27 PROCEDURE — 2500000003 HC RX 250 WO HCPCS: Performed by: RADIOLOGY

## 2022-01-27 PROCEDURE — 6360000004 HC RX CONTRAST MEDICATION: Performed by: RADIOLOGY

## 2022-01-27 PROCEDURE — A4641 RADIOPHARM DX AGENT NOC: HCPCS | Performed by: RADIOLOGY

## 2022-01-27 PROCEDURE — 74220 X-RAY XM ESOPHAGUS 1CNTRST: CPT

## 2022-01-27 RX ADMIN — BARIUM SULFATE 1 TABLET: 700 TABLET ORAL at 11:20

## 2022-01-27 RX ADMIN — BARIUM SULFATE 140 ML: 980 POWDER, FOR SUSPENSION ORAL at 11:21

## 2022-01-27 RX ADMIN — BARIUM SULFATE 176 G: 960 POWDER, FOR SUSPENSION ORAL at 11:21

## 2022-02-03 DIAGNOSIS — N93.9 VAGINAL BLEEDING: Primary | ICD-10-CM

## 2022-02-11 ENCOUNTER — TELEPHONE (OUTPATIENT)
Dept: FAMILY MEDICINE CLINIC | Age: 87
End: 2022-02-11

## 2022-02-11 NOTE — TELEPHONE ENCOUNTER
----- Message from Bard Najera sent at 2/11/2022 11:32 AM EST -----  Subject: Appointment Request    Reason for Call: Routine Existing Condition Follow Up    QUESTIONS  Type of Appointment? Established Patient  Reason for appointment request? Available appointments did not meet   patient need  Additional Information for Provider? PT's daughter called in to schedule   an appointment to be seen for swelling and wheezing  ---------------------------------------------------------------------------  --------------  CALL BACK INFO  What is the best way for the office to contact you? OK to leave message on   voicemail  Preferred Call Back Phone Number? [phone]  ---------------------------------------------------------------------------  --------------  SCRIPT ANSWERS  Relationship to Patient? Other  Representative Name? Rob Charles  Additional information verified (besides Name and Date of Birth)? Address  Is this follow up request related to routine Diabetes Management? No  Have you been diagnosed with, awaiting test results for, or told that you   are suspected of having COVID-19 (Coronavirus)? (If patient has tested   negative or was tested as a requirement for work, school, or travel and   not based on symptoms, answer no)? No  Within the past two weeks have you developed any of the following symptoms   (answer no if symptoms have been present longer than 2 weeks or began   more than 2 weeks ago)? Fever or Chills, Cough, Shortness of breath or   difficulty breathing, Loss of taste or smell, Sore throat, Nasal   congestion, Sneezing or runny nose, Fatigue or generalized body aches   (answer no if pain is specific to a body part e.g. back pain), Diarrhea,   Headache? No  Have you had close contact with someone with COVID-19 in the last 14 days? No  (Service Expert  click yes below to proceed with Find That File As Usual   Scheduling)?  Yes

## 2022-02-14 ENCOUNTER — OFFICE VISIT (OUTPATIENT)
Dept: FAMILY MEDICINE CLINIC | Age: 87
End: 2022-02-14
Payer: MEDICARE

## 2022-02-14 VITALS
TEMPERATURE: 98.3 F | SYSTOLIC BLOOD PRESSURE: 168 MMHG | HEART RATE: 85 BPM | WEIGHT: 99.2 LBS | OXYGEN SATURATION: 99 % | HEIGHT: 56 IN | BODY MASS INDEX: 22.32 KG/M2 | DIASTOLIC BLOOD PRESSURE: 70 MMHG

## 2022-02-14 DIAGNOSIS — R06.2 WHEEZING: ICD-10-CM

## 2022-02-14 DIAGNOSIS — R60.0 PEDAL EDEMA: ICD-10-CM

## 2022-02-14 DIAGNOSIS — R13.19 ESOPHAGEAL DYSPHAGIA: ICD-10-CM

## 2022-02-14 DIAGNOSIS — S22.000A COMPRESSION FRACTURE OF BODY OF THORACIC VERTEBRA (HCC): ICD-10-CM

## 2022-02-14 DIAGNOSIS — N93.9 VAGINAL BLEEDING: Primary | ICD-10-CM

## 2022-02-14 PROCEDURE — G8427 DOCREV CUR MEDS BY ELIG CLIN: HCPCS | Performed by: FAMILY MEDICINE

## 2022-02-14 PROCEDURE — G8484 FLU IMMUNIZE NO ADMIN: HCPCS | Performed by: FAMILY MEDICINE

## 2022-02-14 PROCEDURE — 1123F ACP DISCUSS/DSCN MKR DOCD: CPT | Performed by: FAMILY MEDICINE

## 2022-02-14 PROCEDURE — 1036F TOBACCO NON-USER: CPT | Performed by: FAMILY MEDICINE

## 2022-02-14 PROCEDURE — G8420 CALC BMI NORM PARAMETERS: HCPCS | Performed by: FAMILY MEDICINE

## 2022-02-14 PROCEDURE — 4040F PNEUMOC VAC/ADMIN/RCVD: CPT | Performed by: FAMILY MEDICINE

## 2022-02-14 PROCEDURE — 1090F PRES/ABSN URINE INCON ASSESS: CPT | Performed by: FAMILY MEDICINE

## 2022-02-14 PROCEDURE — 99214 OFFICE O/P EST MOD 30 MIN: CPT | Performed by: FAMILY MEDICINE

## 2022-02-14 RX ORDER — SIMETHICONE 125 MG
CAPSULE ORAL EVERY 8 HOURS PRN
COMMUNITY

## 2022-02-14 RX ORDER — LIDOCAINE 50 MG/G
1 PATCH TOPICAL DAILY
Qty: 30 PATCH | Refills: 0 | Status: SHIPPED
Start: 2022-02-14 | End: 2022-02-21 | Stop reason: SDUPTHER

## 2022-02-14 RX ORDER — ALBUTEROL SULFATE 2.5 MG/3ML
2.5 SOLUTION RESPIRATORY (INHALATION) ONCE
Status: COMPLETED | OUTPATIENT
Start: 2022-02-14 | End: 2022-02-14

## 2022-02-14 RX ADMIN — ALBUTEROL SULFATE 2.5 MG: 2.5 SOLUTION RESPIRATORY (INHALATION) at 16:36

## 2022-02-14 NOTE — PROGRESS NOTES
[address] [place] [address]  Office Progress Note - Dr. Tiana Giron  2/14/22    CC:   Chief Complaint   Patient presents with    Leg Swelling     Bi-lat ankle swelling     Breathing Problem        BP (!) 168/70 (Site: Right Upper Arm, Position: Sitting, Cuff Size: Medium Adult)   Pulse 85   Temp 98.3 °F (36.8 °C) (Temporal)   Ht 4' 8\" (1.422 m)   Wt 99 lb 3.2 oz (45 kg)   SpO2 99%   BMI 22.24 kg/m²   Wt Readings from Last 3 Encounters:   02/14/22 99 lb 3.2 oz (45 kg)   01/13/22 96 lb (43.5 kg)   09/30/21 105 lb (47.6 kg)       HPI: from assist living for report of ankle swelling and wheezing. With daughter today. Says had covid in the facility, were pretty much confined to rooms, walking much less, not getting dressed during the day and that's when the pedal edema was noted. Since covid restrictions lifted and has been moving around more, pedal edema seems improved. Has +1 pedal edema on left foot and trace edema on left ankle. Has trace pedal edema in right foot. Has audible wheezing today. She does not appreciate this, nor feels SOB. O2 sat 99% RA. Expiratory wheezing in all lung fields. They've noted wheezing more regularly on phone calls and in person. She has no rales on exam. She was given a neb albuterol tx with resolution of the wheezing. She does not feel any different than before the Tx. She's ambivalent about home neb use. Also discussed alb inh with spacer. She has had dysphagia noted more often and weight loss. We did esophagram which confirmed old findings of severe corkscrew esophagus. No new stenosis seen. She has been working with speech therapy and gained 3 pounds since I saw her last month. She has been having some brownish vaginal DC which increased to more like vaginal bleeding over the past weeks. TVUS is pending for next steps. She is back and forth on whether she will get this done or not.      Continues to complain of back pain from known old compression fractures salonpas pads were not helpful.    _________________________________________________________    Assessment / Sheila Person was seen today for leg swelling and breathing problem. Diagnoses and all orders for this visit:    Vaginal bleeding  TVUS pending that I ordered between this appt and her last after reports of persistent vaginal DC/bleeding. Esophageal dysphagia  Esophagram reviewed with severe corkscrew esophagus. Working with speech therapy and on pureed diet. Decision so far to not Tx with medication. Pedal edema  Likely dependent edema during covid lock down at facility and was essentially confined to room. Improved since restrictions lifted and moving around more. Wheezing  -     albuterol (PROVENTIL) nebulizer solution 2.5 mg  ?RAD. Histoical asthma. Does not feel bad, SOB, or appreciate wheezing. Resolved with albuterol Neb. Offered home neb and she will consider. CXR normal last month. Compression fracture of body of thoracic vertebra (HCC)  -     lidocaine (LIDODERM) 5 %; Place 1 patch onto the skin daily 12 hours on, 12 hours off. Keep reg FU or as scheduled. Patient counseled to follow up sooner or seek more acute care if symptoms worsening or not improving according to plan.      Electronically signed by Moises Sousa MD on 2/15/2022    _________________________________________________________  Current Outpatient Medications on File Prior to Visit   Medication Sig Dispense Refill    Simethicone 125 MG CAPS Take by mouth every 8 hours as needed (for gas/bloat)      MYRBETRIQ 25 MG TB24 TAKE 1 TABLET BY MOUTH  DAILY FOR URINARY  INCONTINENCE 90 tablet 3    sertraline (ZOLOFT) 100 MG tablet TAKE 1 TABLET BY MOUTH  DAILY 90 tablet 3    loratadine (CLARITIN) 10 MG tablet Take 10 mg by mouth daily      metFORMIN (GLUCOPHAGE) 500 MG tablet Take 1 tablet by mouth daily With supper 90 tablet 3    losartan (COZAAR) 50 MG tablet Take 1 tablet by mouth daily 90 tablet 3    levothyroxine (SYNTHROID) 25 MCG tablet Take 1 tablet by mouth Daily 90 tablet 3    lansoprazole (PREVACID) 15 MG delayed release capsule Take 1 capsule by mouth daily 90 capsule 3    glipiZIDE (GLUCOTROL XL) 5 MG extended release tablet TAKE 1 TABLET BY MOUTH  DAILY WITH BREAKFAST 90 tablet 3    donepezil (ARICEPT) 10 MG tablet TAKE 1 TABLET BY MOUTH AT  NIGHT 90 tablet 3    B-D ULTRA-FINE 33 LANCETS MISC TRUEplus Lancets 33 gauge 100 each 1    blood glucose monitor strips Test 1 time daily and as needed for symptoms of irregular glucose. 50 strip 3    fluticasone (FLONASE) 50 MCG/ACT nasal spray 1 spray by Each Nostril route daily      blood glucose test strips (ASCENSIA AUTODISC VI;ONE TOUCH ULTRA TEST VI) strip 1 each by In Vitro route daily As needed. 100 each 2    Cholecalciferol (VITAMIN D-3 SUPER STRENGTH) 50 MCG (2000 UT) TABS Take 1 tablet by mouth daily      Blood Glucose Monitoring Suppl (TRUE METRIX AIR GLUCOSE METER) KEVON True Metrix Glucose Meter   USE AS DIRECTED TO TEST SUGAR      Probiotic Product (PROBIOTIC PO) Take 1 capsule by mouth      docusate sodium (COLACE) 100 MG capsule Take 100 mg by mouth daily        No current facility-administered medications on file prior to visit.        Patient Active Problem List   Diagnosis Code    Macular degeneration of both eyes H35.30    Chronic anemia D64.9    Type 2 diabetes mellitus without complication, without long-term current use of insulin ([facility] E11.9    Hypothyroidism E03.9    Gastroesophageal reflux disease K21.9    Essential hypertension I10    Late onset Alzheimer's disease without behavioral disturbance ([facility]) G30.1, F02.80    Anxiety F41.9    Age-related osteoporosis with current pathological fracture M80.00XA     _________________________________________________________  Past Medical History:   Diagnosis Date    Anemia     285.9    Cerebrovascular disease, unspecified     437.9    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 Manuela Wilkinson of upper limb[facility] left ([facility] 08[facility]22[facility]2017    Dr. Jude Holguin    Multinodular goiter (nontoxic)     Osteoporosis     with compression fx T8/12,L2/5,  805.8/M48. 50Xa    Peptic reflux disease     Tremor     R25.1       Family History   Problem Relation Age of Onset    Cancer Mother         throat    Cancer Father         bladder    Breast Cancer Sister     Diabetes Paternal Grandmother     Arthritis Paternal Grandmother     Breast Cancer Other         cousin       Past Surgical History:   Procedure Laterality Date    CATARACT REMOVAL WITH IMPLANT Bilateral 2011    Dr. Bj Ibrahim  10/21/2015     [facility], normal    OTHER SURGICAL HISTORY      Lumbar epidurals, Dr. Sandra Barnes  02/08/2010    Dr. Bruce Cormier, Hiatal hernia       Social History     Tobacco Use    Smoking status: Never Smoker    Smokeless tobacco: Never Used   Substance Use Topics    Alcohol use: Yes     Comment: occ    Drug use: Never       Chart reviewed and updated where appropriate for PMH, Fam, and Soc Hx.  _________________________________________________________  ROS: POSITIVE: As in the HPI. Otherwise Pertinent negatives are negative.    __________________________________________________________  Physical Exam   Constitutional:    She is oriented to person, place, and time.     She appears well-developed and well-nourished. Eyes:    Conjunctivae are normal.    Pupils are equal, round, and reactive to light. EOMI. Neck:    Normal range of motion. No thyromegaly or nodules noted. No bruit. No LAD. Cardiovascular:    Normal rate, regular rhythm and normal heart sounds. No murmur. No gallop and no friction rub. Pulmonary/Chest:    Effort normal and breath sounds normal.    ++wheezes. No rales or rhonchi. Abdominal:    Soft. Bowel sounds are normal.    No distension. No tenderness. Musculoskeletal:    Normal range of motion. No joint swelling noted. Edema as in HPI  Psychiatric:    She has a normal mood and affect. Normal groom and dress. No SI or HI.   ________________________________________________________    This note may have been created using dictation software.  Efforts were made to reduce errors, but some may persist.

## 2022-02-15 DIAGNOSIS — G89.29 CHRONIC BILATERAL THORACIC BACK PAIN: ICD-10-CM

## 2022-02-15 DIAGNOSIS — R07.89 CHEST WALL PAIN: Primary | ICD-10-CM

## 2022-02-15 DIAGNOSIS — M54.6 CHRONIC BILATERAL THORACIC BACK PAIN: ICD-10-CM

## 2022-02-15 DIAGNOSIS — S22.000A COMPRESSION FRACTURE OF BODY OF THORACIC VERTEBRA (HCC): ICD-10-CM

## 2022-02-15 RX ORDER — ALBUTEROL SULFATE 2.5 MG/3ML
2.5 SOLUTION RESPIRATORY (INHALATION) EVERY 6 HOURS PRN
Qty: 120 EACH | Refills: 3 | Status: SHIPPED | OUTPATIENT
Start: 2022-02-15

## 2022-02-15 NOTE — TELEPHONE ENCOUNTER
Please start prior Auth. She has compression fractures of her back. She is not a good candidate for NSAIDs, though she is using this at low doses. Topical Salonpas have not been effective. Tylenol is not effective. She is not a good candidate for opiate therapy.

## 2022-02-16 ENCOUNTER — TELEPHONE (OUTPATIENT)
Dept: FAMILY MEDICINE CLINIC | Age: 87
End: 2022-02-16

## 2022-02-16 NOTE — TELEPHONE ENCOUNTER
Patient's daughter called asking if an order was sent over to [address] [place] [address] for a nebulizer machine? She is supposed to be getting an albuterol treatment at home.

## 2022-02-16 NOTE — TELEPHONE ENCOUNTER
I placed Rx in fax bin at [facility] on Tuesday night attached to a fax from [facility]. Thatll need sent to medical supply store. Albuterol prescription already sent to pharmacy.

## 2022-02-18 ENCOUNTER — PATIENT MESSAGE (OUTPATIENT)
Dept: FAMILY MEDICINE CLINIC | Age: 87
End: 2022-02-18

## 2022-02-18 DIAGNOSIS — F41.9 ANXIETY: Primary | ICD-10-CM

## 2022-02-18 RX ORDER — ALPRAZOLAM 0.25 MG/1
0.25 TABLET ORAL NIGHTLY PRN
Qty: 10 TABLET | Refills: 0 | Status: SHIPPED | OUTPATIENT
Start: 2022-02-18 | End: 2022-02-18 | Stop reason: SDUPTHER

## 2022-02-18 RX ORDER — ALPRAZOLAM 0.25 MG/1
0.25 TABLET ORAL NIGHTLY PRN
Qty: 10 TABLET | Refills: 0 | Status: SHIPPED | OUTPATIENT
Start: 2022-02-18 | End: 2022-03-20

## 2022-02-18 NOTE — TELEPHONE ENCOUNTER
From: Neftali Bonilla  To: Dr. Ivon Back  Sent: 2/18/2022 8:42 AM EST  Subject: Need something for anxiety     This is Lulu Shaffer daughter. My Mom seems to be having some increased anxiety, especially in the evenings. She had a spell last evening where she claimed she couldn't breath , but all her vitals were as normal with a pulse ox of 99. The nurse at assisted living did give her another breathing treatment but there was no change in her condition. I was able to get her to calm down by washing her face and helping her get ready for bed and the TV on for a distraction. She did not appear to be in any respiratory distress. She had been perfectly fine an hour before and had eaten almost all of her dinner tray. She seems to be fixated on how she is \"just not feeling well\" an that she knows that something is wrong with her. I think a small dose of something in the evening when she has these \"moments\" may be needed. What would you recommend? Could you send an order for a small dose of Ativan or something to help with the night time anxiety? Please feel free to give me a call   at [phone] to discuss if needed.  Thanks

## 2022-02-21 RX ORDER — LIDOCAINE 50 MG/G
1 PATCH TOPICAL DAILY
Qty: 30 PATCH | Refills: 0 | Status: SHIPPED | OUTPATIENT
Start: 2022-02-21 | End: 2022-03-23

## 2022-02-21 NOTE — TELEPHONE ENCOUNTER
I sent to [facility] in [place] with different diagnoses. May or may not work.

## 2022-03-01 ENCOUNTER — TELEPHONE (OUTPATIENT)
Dept: FAMILY MEDICINE CLINIC | Age: 87
End: 2022-03-01

## 2022-03-01 NOTE — TELEPHONE ENCOUNTER
Physical therapist Jethro Medina calling he did her PT eval. He is asking for a verbal ok to get occupational therpay out to do a low vision eval for ADL needs. Ok to give?

## 2022-03-04 ENCOUNTER — TELEPHONE (OUTPATIENT)
Dept: FAMILY MEDICINE CLINIC | Age: 87
End: 2022-03-04

## 2022-03-04 NOTE — TELEPHONE ENCOUNTER
[facility] called in the office today wanting to let Dr. Pasquale Short know that they will be doing occupational therapy in the home.

## 2022-03-11 ENCOUNTER — TELEPHONE (OUTPATIENT)
Dept: FAMILY MEDICINE CLINIC | Age: 87
End: 2022-03-11

## 2022-03-11 NOTE — TELEPHONE ENCOUNTER
[facility] sent fax saying that pt's urine dipped positive for leukocytes. Any further orders? Please advise.     [employer] for pharmacy  [facility] Ph[phone] [facility] Fx# [phone]

## 2022-03-14 LAB
ORGANISM: ABNORMAL
URINE CULTURE, ROUTINE: ABNORMAL

## 2022-03-14 RX ORDER — AMOXICILLIN 500 MG/1
500 CAPSULE ORAL 2 TIMES DAILY
Qty: 14 CAPSULE | Refills: 0 | Status: SHIPPED | OUTPATIENT
Start: 2022-03-14 | End: 2022-03-21

## 2022-03-14 NOTE — PROGRESS NOTES
Sent amoxicillin to [facility]- pharmacy for group B strep UTI on urine culture with report of weakness and change in condition. Sensitive to ampicillin and benzylpenicillin.

## 2022-03-15 NOTE — TELEPHONE ENCOUNTER
[facility] after reviewing urine culture results.

## 2022-05-21 DIAGNOSIS — K21.9 GASTROESOPHAGEAL REFLUX DISEASE, UNSPECIFIED WHETHER ESOPHAGITIS PRESENT: ICD-10-CM

## 2022-05-21 DIAGNOSIS — G30.1 LATE ONSET ALZHEIMER'S DISEASE WITHOUT BEHAVIORAL DISTURBANCE (HCC): ICD-10-CM

## 2022-05-21 DIAGNOSIS — F02.80 LATE ONSET ALZHEIMER'S DISEASE WITHOUT BEHAVIORAL DISTURBANCE (HCC): ICD-10-CM

## 2022-05-21 DIAGNOSIS — I10 ESSENTIAL HYPERTENSION: ICD-10-CM

## 2022-05-21 DIAGNOSIS — E11.9 TYPE 2 DIABETES MELLITUS WITHOUT COMPLICATION, WITHOUT LONG-TERM CURRENT USE OF INSULIN (HCC): ICD-10-CM

## 2022-05-21 DIAGNOSIS — E03.9 HYPOTHYROIDISM, UNSPECIFIED TYPE: ICD-10-CM

## 2022-05-23 RX ORDER — LANSOPRAZOLE 15 MG/1
15 CAPSULE, DELAYED RELEASE ORAL DAILY
Qty: 90 CAPSULE | Refills: 3 | OUTPATIENT
Start: 2022-05-23

## 2022-05-23 RX ORDER — LEVOTHYROXINE SODIUM 0.03 MG/1
25 TABLET ORAL DAILY
Qty: 90 TABLET | Refills: 3 | OUTPATIENT
Start: 2022-05-23

## 2022-05-23 RX ORDER — LOSARTAN POTASSIUM 50 MG/1
50 TABLET ORAL DAILY
Qty: 90 TABLET | Refills: 3 | OUTPATIENT
Start: 2022-05-23

## 2022-05-23 RX ORDER — GLIPIZIDE 5 MG/1
TABLET, FILM COATED, EXTENDED RELEASE ORAL
Qty: 90 TABLET | Refills: 3 | OUTPATIENT
Start: 2022-05-23

## 2022-05-23 RX ORDER — DONEPEZIL HYDROCHLORIDE 10 MG/1
TABLET, FILM COATED ORAL
Qty: 90 TABLET | Refills: 3 | OUTPATIENT
Start: 2022-05-23

## 2022-05-23 NOTE — TELEPHONE ENCOUNTER
I think the nursing home doctor is now caring for her. Her family wrote me in the past and said that she was transitioning and the nursing home or assisted living facility doctor was going to take over. Looks like these refill requests are coming from the automated .

## 2023-06-21 NOTE — TELEPHONE ENCOUNTER
Augusto Garcia - Daughter   [phone]       She is calling about the recent visit with Dr Kathe Espinoza. He has suggested that she get a gynecologist who can help her with her prolapse bladder because it is getting worse. They do not know of one. She is asking if you can suggest someone for this? None available (1) Female